# Patient Record
Sex: FEMALE | Race: WHITE | NOT HISPANIC OR LATINO | Employment: FULL TIME | URBAN - METROPOLITAN AREA
[De-identification: names, ages, dates, MRNs, and addresses within clinical notes are randomized per-mention and may not be internally consistent; named-entity substitution may affect disease eponyms.]

---

## 2017-01-03 ENCOUNTER — OFFICE VISIT (OUTPATIENT)
Dept: FAMILY MEDICINE CLINIC | Facility: CLINIC | Age: 52
End: 2017-01-03

## 2017-01-03 VITALS
WEIGHT: 293 LBS | HEART RATE: 82 BPM | OXYGEN SATURATION: 95 % | HEIGHT: 68 IN | SYSTOLIC BLOOD PRESSURE: 108 MMHG | BODY MASS INDEX: 44.41 KG/M2 | DIASTOLIC BLOOD PRESSURE: 84 MMHG

## 2017-01-03 DIAGNOSIS — I10 ESSENTIAL HYPERTENSION: ICD-10-CM

## 2017-01-03 DIAGNOSIS — E66.01 MORBID OBESITY DUE TO EXCESS CALORIES (HCC): ICD-10-CM

## 2017-01-03 DIAGNOSIS — E11.9 TYPE 2 DIABETES MELLITUS WITHOUT COMPLICATION, WITHOUT LONG-TERM CURRENT USE OF INSULIN (HCC): Primary | ICD-10-CM

## 2017-01-03 LAB — HBA1C MFR BLD: 6.9 %

## 2017-01-03 PROCEDURE — 99213 OFFICE O/P EST LOW 20 MIN: CPT | Performed by: FAMILY MEDICINE

## 2017-01-03 PROCEDURE — 83036 HEMOGLOBIN GLYCOSYLATED A1C: CPT | Performed by: FAMILY MEDICINE

## 2017-01-03 NOTE — MR AVS SNAPSHOT
Isa Vogel   1/3/2017 3:00 PM   Office Visit    Provider:  Dayna Llamas MD   Department:  [facility]   Dept Phone:  [phone]                Your Full Care Plan              Your Updated Medication List          This list is accurate as of: 1/3/17  3:49 PM.  Always use your most recent med list.                * albuterol (2.5 MG/3ML) 0.083% nebulizer solution   Commonly known as:  PROVENTIL       * albuterol 108 (90 BASE) MCG/ACT inhaler   Commonly known as:  PROVENTIL HFA;VENTOLIN HFA   Inhale 2 puffs every 4 (four) hours as needed for wheezing.       azithromycin 250 MG tablet   Commonly known as:  ZITHROMAX Z-FOX   Take 2 tablets the first day, then 1 tablet daily for 4 days.       Empagliflozin 25 MG tablet   Commonly known as:  JARDIANCE   Take 1 tablet/day by mouth Daily.       * HYDROcodone-acetaminophen 7.5-325 MG per tablet   Commonly known as:  NORCO       * HYDROcodone-acetaminophen 5-325 MG per tablet   Commonly known as:  NORCO   Take 1 tablet by mouth Every 6 (Six) Hours As Needed for moderate pain (4-6).       losartan-hydrochlorothiazide 100-25 MG per tablet   Commonly known as:  HYZAAR   Take 1 tablet by mouth daily.       metFORMIN 1000 MG (OSM) 24 hr tablet   Commonly known as:  FORTAMET   Take 1 tablet by mouth daily with breakfast.       predniSONE 20 MG tablet   Commonly known as:  DELTASONE   Take 2 tablets by mouth Daily.       promethazine-dextromethorphan 6.25-15 MG/5ML syrup   Commonly known as:  PROMETHAZINE-DM   Take 5 mL by mouth 4 (four) times a day as needed for cough.       simvastatin 20 MG tablet   Commonly known as:  ZOCOR   Take 1 tablet by mouth every night.       ZANTAC 300 MG tablet   Generic drug:  raNITIdine       * Notice:  This list has 4 medication(s) that are the same as other medications prescribed for you. Read the directions carefully, and ask your doctor or other care provider to review them with  "you.            We Performed the Following     POC Glycosylated Hemoglobin (Hb A1C)       You Were Diagnosed With        Codes Comments    Type 2 diabetes mellitus without complication, without long-term current use of insulin    -  Primary ICD-10-CM: E11.9  ICD-9-CM: 250.00     Essential hypertension     ICD-10-CM: I10  ICD-9-CM: 401.9     Morbid obesity due to excess calories     ICD-10-CM: E66.01  ICD-9-CM: 278.01       Medications to be Given to You by a Medical Professional     Due       Frequency    (none) meclizine (ANTIVERT) tablet 25 mg  3 Times Daily PRN      Instructions     None    Patient Instructions History      [employer] Signup     Our records indicate that you have an active [employer] account.    You can view your After Visit Summary by going to [employer] and logging in with your [employer] username and password.  If you don't have a [employer] username and password but a parent or guardian has access to your record, the parent or guardian should login with their own [employer] username and password and access your record to view the After Visit Summary.    If you have questions, you can email [email or web address] or call [phone] to talk to our [employer] staff.  Remember, [employer] is NOT to be used for urgent needs.  For medical emergencies, dial 911.               Other Info from Your Visit           Your Appointments     Apr 04, 2017  9:45 AM EDT   Follow Up with Dayna Llamas MD   [facility] (--)    [address] [place] [address] [address]  [facility] [id number] [phone]           Arrive 15 minutes prior to appointment.              Allergies     Lisinopril      Tramadol        Vital Signs     Blood Pressure Pulse Height Weight Oxygen Saturation Body Mass Index    108/84 82 68\" (172.7 cm) 293 lb (133 kg) 95% 44.55 kg/m2    Smoking Status                   Never Smoker           Problems and Diagnoses Noted     Diabetes    High blood " pressure    Severe obesity      Results     POC Glycosylated Hemoglobin (Hb A1C)      Component Value Standard Range & Units    Hemoglobin A1C 6.9 %

## 2017-01-03 NOTE — PROGRESS NOTES
Subjective   Isa Vogel is a 51 y.o. female. Pt is here for 3 month a1c check.     Diabetes   She presents for her follow-up diabetic visit. She has type 2 diabetes mellitus. No MedicAlert identification noted. The initial diagnosis of diabetes was made 6 months ago. Her disease course has been improving. There are no hypoglycemic associated symptoms. There are no diabetic associated symptoms. There are no hypoglycemic complications. Symptoms are stable. There are no diabetic complications. Risk factors for coronary artery disease include diabetes mellitus, dyslipidemia, obesity and hypertension. Current diabetic treatment includes diet, oral agent (monotherapy) and oral agent (dual therapy). She is compliant with treatment all of the time. Her weight is stable. She is following a diabetic diet. Meal planning includes avoidance of concentrated sweets. She has not had a previous visit with a dietitian (insurance would not cover.). Exercise: treadmill 2 times a week.  2 miles.   There is no change in her home blood glucose trend. (Haven't been checking recently, will occasionally check at work.  ) An ACE inhibitor/angiotensin II receptor blocker is being taken. She does not see a podiatrist.Eye exam is current (saw optho 11/16. new glasses).        The following portions of the patient's history were reviewed and updated as appropriate: allergies, current medications, past family history, past medical history, past social history, past surgical history and problem list.    Review of Systems   Constitutional: Negative.    HENT: Negative.    Eyes: Negative.    Respiratory: Negative.    Cardiovascular: Negative.    Gastrointestinal: Negative.    Genitourinary: Negative.    Musculoskeletal: Negative.         Doing pt twice week for left shoulder rotator cuff surgery dec 1.     Hematological: Negative.    Psychiatric/Behavioral: Negative.        Objective     Vitals:    01/03/17 1503   BP: 108/84   Pulse: 82   SpO2: 95%  "  Weight: 293 lb (133 kg)   Height: 68\" (172.7 cm)       Physical Exam   Constitutional: She is oriented to person, place, and time. She appears well-developed and well-nourished.   HENT:   Head: Normocephalic and atraumatic.   Eyes: EOM are normal. Pupils are equal, round, and reactive to light. Right eye exhibits no discharge. Left eye exhibits no discharge.   Neck: Normal range of motion. Neck supple.   Cardiovascular: Normal rate, regular rhythm, normal heart sounds and intact distal pulses.    Pulmonary/Chest: Effort normal and breath sounds normal.   Abdominal: Soft. Bowel sounds are normal. She exhibits no mass. There is no tenderness.   Musculoskeletal: Normal range of motion.        Right shoulder: She exhibits no swelling.   Neurological: She is alert and oriented to person, place, and time. She has normal reflexes.   Skin: Skin is warm and dry. No cyanosis. Nails show no clubbing.   Psychiatric: She has a normal mood and affect. Her behavior is normal. Judgment and thought content normal.       Assessment/Plan     Problem List Items Addressed This Visit        Cardiovascular and Mediastinum    Essential hypertension       Digestive    Morbid obesity due to excess calories       Endocrine    Diabetes mellitus - Primary    Relevant Orders    POC Glycosylated Hemoglobin (Hb A1C) (Completed)        Pneumovax 23 vaccine 3-4 years ago   Doing well.    Continue current medications.    a1c today 6.9.  "

## 2017-03-30 ENCOUNTER — OFFICE VISIT (OUTPATIENT)
Dept: FAMILY MEDICINE CLINIC | Facility: CLINIC | Age: 52
End: 2017-03-30

## 2017-03-30 VITALS
OXYGEN SATURATION: 99 % | DIASTOLIC BLOOD PRESSURE: 84 MMHG | SYSTOLIC BLOOD PRESSURE: 120 MMHG | BODY MASS INDEX: 43.35 KG/M2 | WEIGHT: 286 LBS | HEIGHT: 68 IN | TEMPERATURE: 97.9 F | HEART RATE: 81 BPM

## 2017-03-30 DIAGNOSIS — R10.12 LEFT UPPER QUADRANT PAIN: ICD-10-CM

## 2017-03-30 DIAGNOSIS — R19.7 DIARRHEA, UNSPECIFIED TYPE: Primary | ICD-10-CM

## 2017-03-30 LAB
ALBUMIN SERPL-MCNC: 4.5 G/DL (ref 3.2–4.8)
ALBUMIN/GLOB SERPL: 1.7 G/DL (ref 1.5–2.5)
ALP SERPL-CCNC: 57 U/L (ref 25–100)
ALT SERPL W P-5'-P-CCNC: 32 U/L (ref 7–40)
AMYLASE SERPL-CCNC: 56 U/L (ref 30–118)
ANION GAP SERPL CALCULATED.3IONS-SCNC: 8 MMOL/L (ref 3–11)
AST SERPL-CCNC: 31 U/L (ref 0–33)
BASOPHILS # BLD AUTO: 0.06 10*3/MM3 (ref 0–0.2)
BASOPHILS NFR BLD AUTO: 0.5 % (ref 0–1)
BILIRUB SERPL-MCNC: 0.6 MG/DL (ref 0.3–1.2)
BUN BLD-MCNC: 16 MG/DL (ref 9–23)
BUN/CREAT SERPL: 16 (ref 7–25)
C DIFF TOX GENS STL QL NAA+PROBE: NOT DETECTED
CALCIUM SPEC-SCNC: 10.7 MG/DL (ref 8.7–10.4)
CHLORIDE SERPL-SCNC: 100 MMOL/L (ref 99–109)
CO2 SERPL-SCNC: 32 MMOL/L (ref 20–31)
CREAT BLD-MCNC: 1 MG/DL (ref 0.6–1.3)
DEPRECATED RDW RBC AUTO: 44.6 FL (ref 37–54)
EOSINOPHIL # BLD AUTO: 0.14 10*3/MM3 (ref 0.1–0.3)
EOSINOPHIL NFR BLD AUTO: 1.3 % (ref 0–3)
ERYTHROCYTE [DISTWIDTH] IN BLOOD BY AUTOMATED COUNT: 12.8 % (ref 11.3–14.5)
GFR SERPL CREATININE-BSD FRML MDRD: 59 ML/MIN/1.73
GLOBULIN UR ELPH-MCNC: 2.6 GM/DL
GLUCOSE BLD-MCNC: 162 MG/DL (ref 70–100)
HAV IGM SERPL QL IA: NORMAL
HBV CORE IGM SERPL QL IA: NORMAL
HBV SURFACE AG SERPL QL IA: NORMAL
HCT VFR BLD AUTO: 47.1 % (ref 34.5–44)
HCV AB SER DONR QL: NORMAL
HGB BLD-MCNC: 15.5 G/DL (ref 11.5–15.5)
IMM GRANULOCYTES # BLD: 0.05 10*3/MM3 (ref 0–0.03)
IMM GRANULOCYTES NFR BLD: 0.4 % (ref 0–0.6)
LIPASE SERPL-CCNC: 51 U/L (ref 6–51)
LYMPHOCYTES # BLD AUTO: 3.2 10*3/MM3 (ref 0.6–4.8)
LYMPHOCYTES NFR BLD AUTO: 28.6 % (ref 24–44)
MCH RBC QN AUTO: 31.5 PG (ref 27–31)
MCHC RBC AUTO-ENTMCNC: 32.9 G/DL (ref 32–36)
MCV RBC AUTO: 95.7 FL (ref 80–99)
MONOCYTES # BLD AUTO: 0.66 10*3/MM3 (ref 0–1)
MONOCYTES NFR BLD AUTO: 5.9 % (ref 0–12)
NEUTROPHILS # BLD AUTO: 7.07 10*3/MM3 (ref 1.5–8.3)
NEUTROPHILS NFR BLD AUTO: 63.3 % (ref 41–71)
PLATELET # BLD AUTO: 344 10*3/MM3 (ref 150–450)
PMV BLD AUTO: 10.5 FL (ref 6–12)
POTASSIUM BLD-SCNC: 4.1 MMOL/L (ref 3.5–5.5)
PROT SERPL-MCNC: 7.1 G/DL (ref 5.7–8.2)
RBC # BLD AUTO: 4.92 10*6/MM3 (ref 3.89–5.14)
SODIUM BLD-SCNC: 140 MMOL/L (ref 132–146)
TSH SERPL DL<=0.05 MIU/L-ACNC: 1.15 MIU/ML (ref 0.35–5.35)
WBC NRBC COR # BLD: 11.18 10*3/MM3 (ref 3.5–10.8)

## 2017-03-30 PROCEDURE — 36415 COLL VENOUS BLD VENIPUNCTURE: CPT | Performed by: NURSE PRACTITIONER

## 2017-03-30 PROCEDURE — 85025 COMPLETE CBC W/AUTO DIFF WBC: CPT | Performed by: NURSE PRACTITIONER

## 2017-03-30 PROCEDURE — 80053 COMPREHEN METABOLIC PANEL: CPT | Performed by: NURSE PRACTITIONER

## 2017-03-30 PROCEDURE — 87046 STOOL CULTR AEROBIC BACT EA: CPT | Performed by: NURSE PRACTITIONER

## 2017-03-30 PROCEDURE — 99214 OFFICE O/P EST MOD 30 MIN: CPT | Performed by: NURSE PRACTITIONER

## 2017-03-30 PROCEDURE — 84443 ASSAY THYROID STIM HORMONE: CPT | Performed by: NURSE PRACTITIONER

## 2017-03-30 PROCEDURE — 87493 C DIFF AMPLIFIED PROBE: CPT | Performed by: NURSE PRACTITIONER

## 2017-03-30 PROCEDURE — 80074 ACUTE HEPATITIS PANEL: CPT | Performed by: NURSE PRACTITIONER

## 2017-03-30 PROCEDURE — 87209 SMEAR COMPLEX STAIN: CPT | Performed by: NURSE PRACTITIONER

## 2017-03-30 PROCEDURE — 87045 FECES CULTURE AEROBIC BACT: CPT | Performed by: NURSE PRACTITIONER

## 2017-03-30 PROCEDURE — 83690 ASSAY OF LIPASE: CPT | Performed by: NURSE PRACTITIONER

## 2017-03-30 PROCEDURE — 87177 OVA AND PARASITES SMEARS: CPT | Performed by: NURSE PRACTITIONER

## 2017-03-30 PROCEDURE — 82150 ASSAY OF AMYLASE: CPT | Performed by: NURSE PRACTITIONER

## 2017-03-30 NOTE — PROGRESS NOTES
Subjective   Isa Vogel is a 51 y.o. female.     History of Present Illness Complaining of several weeks of frequent yellow stools, with pain in left upper quad that radiates to left flank. States pain is sharp, like a catch and only lasts a few seconds. Will occur several times a day. No nausea but diarrhea is bigger symptom. Will have 8 stools a day. Last formed stool was 2 weeks ago. She works at [employer] and they have had c. Diff. She does have increased pain after eating. This is associated with indigestion. No fever. She has treated with Zantac, and zofran.  No emesis. Her colonoscopy was 4 months ago. Showed a polyp and diverticulosis. Neg family history of colon disease.    The following portions of the patient's history were reviewed and updated as appropriate: allergies, current medications, past family history, past medical history, past social history, past surgical history and problem list.    Review of Systems   Constitutional: Negative for fatigue, fever and unexpected weight change.   HENT: Negative for congestion, hearing loss, nosebleeds, rhinorrhea, sore throat, trouble swallowing and voice change.    Eyes: Negative for pain, discharge, redness and visual disturbance.   Respiratory: Negative for cough, chest tightness, shortness of breath and wheezing.    Cardiovascular: Negative for chest pain, palpitations and leg swelling.   Gastrointestinal: Positive for abdominal pain, diarrhea and nausea. Negative for abdominal distention, anal bleeding, blood in stool, constipation and vomiting.   Endocrine: Negative for cold intolerance, heat intolerance, polydipsia, polyphagia and polyuria.   Genitourinary: Negative for dysuria, flank pain, frequency and hematuria.   Musculoskeletal: Negative for arthralgias, gait problem, joint swelling and myalgias.   Skin: Negative for color change and rash.   Neurological: Negative for dizziness, tremors, seizures, syncope, speech difficulty, weakness, numbness and  headaches.   Hematological: Negative.    Psychiatric/Behavioral: Negative.        Objective   Physical Exam   Constitutional: She is oriented to person, place, and time. She appears well-developed and well-nourished. No distress.   HENT:   Head: Normocephalic and atraumatic.   Right Ear: Tympanic membrane and external ear normal.   Left Ear: Tympanic membrane and external ear normal.   Nose: Nose normal.   Mouth/Throat: Oropharynx is clear and moist. No oropharyngeal exudate.   Eyes: Conjunctivae are normal. Pupils are equal, round, and reactive to light. Right eye exhibits no discharge. Left eye exhibits no discharge. No scleral icterus.   Neck: Neck supple. No tracheal deviation present. No thyromegaly present.   Cardiovascular: Normal rate, regular rhythm and normal heart sounds.  Exam reveals no gallop and no friction rub.    No murmur heard.  Pulmonary/Chest: Effort normal and breath sounds normal. No respiratory distress. She has no wheezes.   Abdominal: Soft. Bowel sounds are normal. She exhibits no distension and no mass. There is tenderness.   Diffuse abd pain with palpation. Most tender in LUQ.   Musculoskeletal: She exhibits no edema or deformity.   Lymphadenopathy:     She has no cervical adenopathy.   Neurological: She is alert and oriented to person, place, and time. Coordination normal.   Skin: Skin is warm and dry. No rash noted. No erythema.   Psychiatric: She has a normal mood and affect. Her speech is normal and behavior is normal. Judgment and thought content normal.   Nursing note and vitals reviewed.      Isa was seen today for abdominal pain.    Diagnoses and all orders for this visit:    Diarrhea, unspecified type  -     CBC & Differential  -     Comprehensive Metabolic Panel  -     Hepatitis Panel, Acute  -     TSH  -     Amylase  -     Lipase  -     CBC Auto Differential  -     Stool Culture  -     Clostridium Difficile EIA; Future  -     Ova & Parasite Examination; Future    Left upper  quadrant pain  -     CBC & Differential  -     Comprehensive Metabolic Panel  -     Hepatitis Panel, Acute  -     TSH  -     Amylase  -     Lipase  -     CBC Auto Differential    Discussed differential. Will obtain labs and stool studies. We may need abd US or CT if all negative and symptoms persist. Follow up symptoms persist or worsen.  I personally spent over half of a total 25 minutes face to face with the patient in counseling and discussion and/or coordination of care as described above.

## 2017-04-01 LAB — BACTERIA SPEC AEROBE CULT: NORMAL

## 2017-04-03 ENCOUNTER — TELEPHONE (OUTPATIENT)
Dept: FAMILY MEDICINE CLINIC | Facility: CLINIC | Age: 52
End: 2017-04-03

## 2017-04-03 NOTE — TELEPHONE ENCOUNTER
"----- Message from Oriana Nevarez sent at 4/3/2017  8:36 AM EDT -----  Regarding: RTN CALL  Contact: [phone]  PT CALLED INFORMING US THAT SHE \"JUST MISSED A CALL FROM YOU\"  PLEASE CALL HER BACK WHEN AVAILABLE.      Discussed results of all labs. O&P is still pending. She may need to followup with GI and have another scope.  "

## 2017-04-04 ENCOUNTER — OFFICE VISIT (OUTPATIENT)
Dept: FAMILY MEDICINE CLINIC | Facility: CLINIC | Age: 52
End: 2017-04-04

## 2017-04-04 VITALS
HEIGHT: 68 IN | DIASTOLIC BLOOD PRESSURE: 86 MMHG | BODY MASS INDEX: 44.25 KG/M2 | HEART RATE: 67 BPM | SYSTOLIC BLOOD PRESSURE: 132 MMHG | WEIGHT: 292 LBS | TEMPERATURE: 97.7 F | OXYGEN SATURATION: 98 %

## 2017-04-04 DIAGNOSIS — R10.84 GENERALIZED ABDOMINAL PAIN: ICD-10-CM

## 2017-04-04 DIAGNOSIS — R10.11 RIGHT UPPER QUADRANT PAIN: ICD-10-CM

## 2017-04-04 DIAGNOSIS — E11.9 TYPE 2 DIABETES MELLITUS WITHOUT COMPLICATION, WITHOUT LONG-TERM CURRENT USE OF INSULIN (HCC): Primary | ICD-10-CM

## 2017-04-04 LAB — HBA1C MFR BLD: 7.1 %

## 2017-04-04 PROCEDURE — 83036 HEMOGLOBIN GLYCOSYLATED A1C: CPT | Performed by: FAMILY MEDICINE

## 2017-04-04 PROCEDURE — 99214 OFFICE O/P EST MOD 30 MIN: CPT | Performed by: FAMILY MEDICINE

## 2017-04-04 RX ORDER — PANTOPRAZOLE SODIUM 40 MG/1
40 TABLET, DELAYED RELEASE ORAL DAILY
Qty: 90 TABLET | Refills: 1 | Status: SHIPPED | OUTPATIENT
Start: 2017-04-04 | End: 2017-11-22 | Stop reason: SDUPTHER

## 2017-04-04 RX ORDER — CIPROFLOXACIN 500 MG/1
500 TABLET, FILM COATED ORAL 2 TIMES DAILY
Qty: 28 TABLET | Refills: 0 | Status: SHIPPED | OUTPATIENT
Start: 2017-04-04 | End: 2017-08-28

## 2017-04-04 RX ORDER — METRONIDAZOLE 500 MG/1
500 TABLET ORAL 3 TIMES DAILY
Qty: 28 TABLET | Refills: 0 | Status: SHIPPED | OUTPATIENT
Start: 2017-04-04 | End: 2017-08-28

## 2017-04-04 NOTE — PATIENT INSTRUCTIONS
Diarrhea, Adult  Diarrhea is frequent loose and watery bowel movements. Diarrhea can make you feel weak and cause you to become dehydrated. Dehydration can make you tired and thirsty, cause you to have a dry mouth, and decrease how often you urinate. Diarrhea typically lasts 2-3 days. However, it can last longer if it is a sign of something more serious. It is important to treat your diarrhea as told by your health care provider.  HOME CARE INSTRUCTIONS  Eating and Drinking  Follow these recommendations as told by your health care provider:  · Take an oral rehydration solution (ORS). This is a drink that is sold at pharmacies and retail stores.  · Drink clear fluids, such as water, ice chips, diluted fruit juice, and low-calorie sports drinks.  · Eat bland, easy-to-digest foods in small amounts as you are able. These foods include bananas, applesauce, rice, lean meats, toast, and crackers.  · Avoid drinking fluids that contain a lot of sugar or caffeine, such as energy drinks, sports drinks, and soda.  · Avoid alcohol.  · Avoid spicy or fatty foods.  General Instructions  · Drink enough fluid to keep your urine clear or pale yellow.  · Wash your hands often. If soap and water are not available, use hand .  · Make sure that all people in your household wash their hands well and often.  · Take over-the-counter and prescription medicines only as told by your health care provider.  · Rest at home while you recover.  · Watch your condition for any changes.  · Take a warm bath to relieve any burning or pain from frequent diarrhea episodes.  · Keep all follow-up visits as told by your health care provider. This is important.  SEEK MEDICAL CARE IF:  · You have a fever.  · Your diarrhea gets worse.  · You have new symptoms.  · You cannot keep fluids down.  · You feel light-headed or dizzy.  · You have a headache  · You have muscle cramps.  SEEK IMMEDIATE MEDICAL CARE IF:  · You have chest pain.  · You feel extremely  weak or you faint.  · You have bloody or black stools or stools that look like tar.  · You have severe pain, cramping, or bloating in your abdomen.  · You have trouble breathing or you are breathing very quickly.  · Your heart is beating very quickly.  · Your skin feels cold and clammy.  · You feel confused.  · You have signs of dehydration, such as:    Dark urine, very little urine, or no urine.    Cracked lips.    Dry mouth.    Sunken eyes.    Sleepiness.    Weakness.     This information is not intended to replace advice given to you by your health care provider. Make sure you discuss any questions you have with your health care provider.     Document Released: 12/08/2003 Document Revised: 01/13/2017 Document Reviewed: 08/23/2016  [employer] Interactive Patient Education ©2016 [employer]  Cholelithiasis  Cholelithiasis (also called gallstones) is a form of gallbladder disease in which gallstones form in your gallbladder. The gallbladder is an organ that stores bile made in the liver, which helps digest fats. Gallstones begin as small crystals and slowly grow into stones. Gallstone pain occurs when the gallbladder spasms and a gallstone is blocking the duct. Pain can also occur when a stone passes out of the duct.   RISK FACTORS  · Being female.    · Having multiple pregnancies. Health care providers sometimes advise removing diseased gallbladders before future pregnancies.    · Being obese.  · Eating a diet heavy in fried foods and fat.    · Being older than 60 years and increasing age.    · Prolonged use of medicines containing female hormones.    · Having diabetes mellitus.    · Rapidly losing weight.    · Having a family history of gallstones (heredity).    SYMPTOMS  · Nausea.    · Vomiting.  · Abdominal pain.    · Yellowing of the skin (jaundice).    · Sudden pain. It may persist from several minutes to several hours.  · Fever.    · Tenderness to the touch.   In some cases, when gallstones do not move into  "the bile duct, people have no pain or symptoms. These are called \"silent\" gallstones.   TREATMENT  Silent gallstones do not need treatment. In severe cases, emergency surgery may be required. Options for treatment include:  · Surgery to remove the gallbladder. This is the most common treatment.  · Medicines. These do not always work and may take 6-12 months or more to work.  · Shock wave treatment (extracorporeal biliary lithotripsy). In this treatment an ultrasound machine sends shock waves to the gallbladder to break gallstones into smaller pieces that can pass into the intestines or be dissolved by medicine.  HOME CARE INSTRUCTIONS   · Only take over-the-counter or prescription medicines for pain, discomfort, or fever as directed by your health care provider.    · Follow a low-fat diet until seen again by your health care provider. Fat causes the gallbladder to contract, which can result in pain.    · Follow up with your health care provider as directed. Attacks are almost always recurrent and surgery is usually required for permanent treatment.    SEEK IMMEDIATE MEDICAL CARE IF:   · Your pain increases and is not controlled by medicines.    · You have a fever or persistent symptoms for more than 2-3 days.    · You have a fever and your symptoms suddenly get worse.    · You have persistent nausea and vomiting.    MAKE SURE YOU:   · Understand these instructions.  · Will watch your condition.  · Will get help right away if you are not doing well or get worse.     This information is not intended to replace advice given to you by your health care provider. Make sure you discuss any questions you have with your health care provider.     Document Released: 12/14/2006 Document Revised: 08/20/2014 Document Reviewed: 06/11/2014  [employer] Interactive Patient Education ©2016 [employer]    "

## 2017-04-04 NOTE — PROGRESS NOTES
Subjective   Isa Vogel is a 51 y.o. female. [facility] A1C 7.1 today.     Diabetes   She presents for her follow-up diabetic visit. She has type 2 diabetes mellitus. No MedicAlert identification noted. The initial diagnosis of diabetes was made 6 months ago. Her disease course has been fluctuating. There are no hypoglycemic associated symptoms. Pertinent negatives for hypoglycemia include no confusion, dizziness, headaches or nervousness/anxiousness. Pertinent negatives for diabetes include no blurred vision, no chest pain, no fatigue, no foot paresthesias, no foot ulcerations, no polydipsia, no polyphagia, no polyuria, no visual change, no weakness and no weight loss. There are no hypoglycemic complications. Symptoms are stable. There are no diabetic complications. Risk factors for coronary artery disease include diabetes mellitus, dyslipidemia, family history, hypertension, obesity, post-menopausal, sedentary lifestyle and stress. Current diabetic treatment includes oral agent (dual therapy). She is compliant with treatment all of the time. Her weight is stable. She is following a high fat/cholesterol diet. When asked about meal planning, she reported none. She has not had a previous visit with a dietitian. She participates in exercise three times a week. There is no change in her home blood glucose trend. An ACE inhibitor/angiotensin II receptor blocker is being taken. She does not see a podiatrist.Eye exam is current.   Abdominal Pain   This is a recurrent problem. The current episode started 1 to 4 weeks ago. The onset quality is undetermined. The problem occurs 2 to 4 times per day. The problem has been unchanged. The pain is located in the RUQ. The pain is at a severity of 9/10. The pain is severe. The quality of the pain is sharp. The abdominal pain does not radiate. Associated symptoms include diarrhea, flatus and frequency. Pertinent negatives include no anorexia, arthralgias,  belching, constipation, dysuria, fever, headaches, hematochezia, hematuria, melena, myalgias, nausea, vomiting or weight loss. The pain is aggravated by eating. The pain is relieved by sitting up. She has tried antacids for the symptoms. The treatment provided no relief. Prior diagnostic workup includes lower endoscopy. Her past medical history is significant for GERD. There is no history of abdominal surgery, colon cancer, Crohn's disease, gallstones, irritable bowel syndrome, pancreatitis, PUD or ulcerative colitis.      The following portions of the patient's history were reviewed and updated as appropriate: allergies, current medications, past family history, past medical history, past social history, past surgical history and problem list.    Review of Systems   Constitutional: Negative for chills, fatigue, fever, unexpected weight change and weight loss.   HENT: Negative for congestion, ear pain, nosebleeds, rhinorrhea, sinus pressure, sneezing, sore throat and trouble swallowing.    Eyes: Negative for blurred vision, itching and visual disturbance.   Respiratory: Negative for cough, chest tightness, shortness of breath and wheezing.    Cardiovascular: Negative for chest pain, palpitations and leg swelling.   Gastrointestinal: Positive for abdominal pain, diarrhea and flatus. Negative for abdominal distention, anal bleeding, anorexia, blood in stool, constipation, hematochezia, melena, nausea and vomiting.   Endocrine: Negative for cold intolerance, heat intolerance, polydipsia, polyphagia and polyuria.   Genitourinary: Positive for frequency. Negative for difficulty urinating, dysuria, hematuria and urgency.   Musculoskeletal: Negative for arthralgias, back pain, gait problem and myalgias.   Skin: Negative for rash and wound.   Neurological: Negative for dizziness, weakness, numbness and headaches.   Hematological: Negative for adenopathy. Does not bruise/bleed easily.   Psychiatric/Behavioral: Negative for  "agitation, confusion, decreased concentration and suicidal ideas. The patient is not nervous/anxious.        Objective     Vitals:    04/04/17 0938   BP: 132/86   Pulse: 67   Temp: 97.7 °F (36.5 °C)   SpO2: 98%   Weight: 292 lb (132 kg)   Height: 68\" (172.7 cm)       Physical Exam   Constitutional: She is oriented to person, place, and time. She appears well-developed and well-nourished.   HENT:   Head: Normocephalic and atraumatic.   Right Ear: External ear normal.   Left Ear: External ear normal.   Nose: Nose normal.   Mouth/Throat: Oropharynx is clear and moist.   Eyes: EOM are normal. Pupils are equal, round, and reactive to light. Right eye exhibits no discharge. Left eye exhibits no discharge.   Neck: Normal range of motion. Neck supple. No thyromegaly present.   Cardiovascular: Normal rate, regular rhythm, normal heart sounds and intact distal pulses.  Exam reveals no gallop and no friction rub.    No murmur heard.  Pulmonary/Chest: Effort normal and breath sounds normal. No respiratory distress. She has no wheezes. She has no rales. She exhibits no tenderness.   Abdominal: Soft. Bowel sounds are normal. She exhibits distension. She exhibits no mass. There is tenderness. There is guarding.   RUQ quadrant tenderness. Positive Almendarez's sign.    Musculoskeletal: Normal range of motion. She exhibits no tenderness.        Right shoulder: She exhibits no swelling.   Lymphadenopathy:     She has no cervical adenopathy.   Neurological: She is alert and oriented to person, place, and time. She has normal reflexes. She displays normal reflexes.   Skin: Skin is warm and dry. No cyanosis. Nails show no clubbing.   Psychiatric: She has a normal mood and affect. Her behavior is normal. Judgment and thought content normal.   Vitals reviewed.      Assessment/Plan     Problem List Items Addressed This Visit        Endocrine    Type 2 diabetes mellitus without complication - Primary    Relevant Orders    POC Glycosylated " Hemoglobin (Hb A1C) (Completed)       Nervous and Auditory    Generalized abdominal pain    Relevant Medications    ciprofloxacin (CIPRO) 500 MG tablet    metroNIDAZOLE (FLAGYL) 500 MG tablet    pantoprazole (PROTONIX) 40 MG EC tablet      Other Visit Diagnoses     Right upper quadrant pain        Relevant Orders    US Gallbladder        Check ruq us  If neg start cipro/flagyl   If sx not improved check hida  Start protonix  Fu 8 weeks.   a1c 7.1 up from prior.

## 2017-04-06 ENCOUNTER — HOSPITAL ENCOUNTER (OUTPATIENT)
Dept: ULTRASOUND IMAGING | Facility: HOSPITAL | Age: 52
Discharge: HOME | End: 2017-04-06
Attending: FAMILY MEDICINE | Admitting: FAMILY MEDICINE

## 2017-04-06 DIAGNOSIS — R10.11 RIGHT UPPER QUADRANT PAIN: ICD-10-CM

## 2017-04-06 LAB
O+P SPEC MICRO: NORMAL
O+P STL TRI STN: NORMAL

## 2017-04-06 PROCEDURE — 76705 ECHO EXAM OF ABDOMEN: CPT

## 2017-04-10 DIAGNOSIS — R10.84 GENERALIZED ABDOMINAL PAIN: Primary | ICD-10-CM

## 2017-04-12 ENCOUNTER — TELEPHONE (OUTPATIENT)
Dept: FAMILY MEDICINE CLINIC | Facility: CLINIC | Age: 52
End: 2017-04-12

## 2017-04-12 RX ORDER — ONDANSETRON 4 MG/1
4 TABLET, FILM COATED ORAL EVERY 8 HOURS PRN
Qty: 8 TABLET | Refills: 1 | Status: SHIPPED | OUTPATIENT
Start: 2017-04-12 | End: 2017-12-07 | Stop reason: WASHOUT

## 2017-04-12 NOTE — TELEPHONE ENCOUNTER
----- Message from Dayna Llamas MD sent at 4/10/2017  3:35 PM EDT -----  Notify pt gb neg stones.  Refer hida scan    PT NOTIFIED OF RESULTS AND REFERRAL FOR HIDA SCAN.

## 2017-05-01 ENCOUNTER — HOSPITAL ENCOUNTER (OUTPATIENT)
Dept: NUCLEAR MEDICINE | Facility: HOSPITAL | Age: 52
Discharge: HOME | End: 2017-05-01
Attending: FAMILY MEDICINE

## 2017-05-01 DIAGNOSIS — R10.84 GENERALIZED ABDOMINAL PAIN: ICD-10-CM

## 2017-05-01 PROCEDURE — 0 TECHNETIUM TC 99M MEBROFENIN KIT: Performed by: FAMILY MEDICINE

## 2017-05-01 PROCEDURE — A9537 TC99M MEBROFENIN: HCPCS | Performed by: FAMILY MEDICINE

## 2017-05-01 PROCEDURE — 25010000002 SINCALIDE PER 5 MCG: Performed by: FAMILY MEDICINE

## 2017-05-01 PROCEDURE — 78227 HEPATOBIL SYST IMAGE W/DRUG: CPT

## 2017-05-01 RX ORDER — KIT FOR THE PREPARATION OF TECHNETIUM TC 99M MEBROFENIN 45 MG/10ML
1 INJECTION, POWDER, LYOPHILIZED, FOR SOLUTION INTRAVENOUS
Status: COMPLETED | OUTPATIENT
Start: 2017-05-01 | End: 2017-05-01

## 2017-05-01 RX ADMIN — SINCALIDE 2.6 MCG: 5 INJECTION, POWDER, LYOPHILIZED, FOR SOLUTION INTRAVENOUS at 13:15

## 2017-05-01 RX ADMIN — MEBROFENIN 1 DOSE: 45 INJECTION, POWDER, LYOPHILIZED, FOR SOLUTION INTRAVENOUS at 12:25

## 2017-05-02 DIAGNOSIS — R94.8 ABNORMAL BILIARY HIDA SCAN: Primary | ICD-10-CM

## 2017-05-10 ENCOUNTER — APPOINTMENT (OUTPATIENT)
Dept: PREADMISSION TESTING | Facility: HOSPITAL | Age: 52
End: 2017-05-10

## 2017-05-10 LAB
ALBUMIN SERPL-MCNC: 4.3 G/DL (ref 3.2–4.8)
ALBUMIN/GLOB SERPL: 1.7 G/DL (ref 1.5–2.5)
ALP SERPL-CCNC: 64 U/L (ref 25–100)
ALT SERPL W P-5'-P-CCNC: 34 U/L (ref 7–40)
ANION GAP SERPL CALCULATED.3IONS-SCNC: 1 MMOL/L (ref 3–11)
AST SERPL-CCNC: 28 U/L (ref 0–33)
BILIRUB SERPL-MCNC: 0.5 MG/DL (ref 0.3–1.2)
BUN BLD-MCNC: 13 MG/DL (ref 9–23)
BUN/CREAT SERPL: 13 (ref 7–25)
CALCIUM SPEC-SCNC: 9.9 MG/DL (ref 8.7–10.4)
CHLORIDE SERPL-SCNC: 106 MMOL/L (ref 99–109)
CO2 SERPL-SCNC: 33 MMOL/L (ref 20–31)
CREAT BLD-MCNC: 1 MG/DL (ref 0.6–1.3)
DEPRECATED RDW RBC AUTO: 45 FL (ref 37–54)
ERYTHROCYTE [DISTWIDTH] IN BLOOD BY AUTOMATED COUNT: 12.9 % (ref 11.3–14.5)
GFR SERPL CREATININE-BSD FRML MDRD: 59 ML/MIN/1.73
GLOBULIN UR ELPH-MCNC: 2.6 GM/DL
GLUCOSE BLD-MCNC: 121 MG/DL (ref 70–100)
HCT VFR BLD AUTO: 46.2 % (ref 34.5–44)
HGB BLD-MCNC: 15.2 G/DL (ref 11.5–15.5)
MCH RBC QN AUTO: 31.6 PG (ref 27–31)
MCHC RBC AUTO-ENTMCNC: 32.9 G/DL (ref 32–36)
MCV RBC AUTO: 96 FL (ref 80–99)
PLATELET # BLD AUTO: 318 10*3/MM3 (ref 150–450)
PMV BLD AUTO: 10.3 FL (ref 6–12)
POTASSIUM BLD-SCNC: 4.2 MMOL/L (ref 3.5–5.5)
PROT SERPL-MCNC: 6.9 G/DL (ref 5.7–8.2)
RBC # BLD AUTO: 4.81 10*6/MM3 (ref 3.89–5.14)
SODIUM BLD-SCNC: 140 MMOL/L (ref 132–146)
WBC NRBC COR # BLD: 10.51 10*3/MM3 (ref 3.5–10.8)

## 2017-05-10 PROCEDURE — 36415 COLL VENOUS BLD VENIPUNCTURE: CPT

## 2017-05-10 PROCEDURE — 93005 ELECTROCARDIOGRAM TRACING: CPT

## 2017-05-10 PROCEDURE — 80053 COMPREHEN METABOLIC PANEL: CPT | Performed by: SURGERY

## 2017-05-10 PROCEDURE — 85027 COMPLETE CBC AUTOMATED: CPT | Performed by: SURGERY

## 2017-05-17 ENCOUNTER — LAB REQUISITION (OUTPATIENT)
Dept: LAB | Facility: HOSPITAL | Age: 52
End: 2017-05-17

## 2017-05-17 DIAGNOSIS — K82.8 OTHER SPECIFIED DISEASES OF GALLBLADDER: ICD-10-CM

## 2017-05-17 PROCEDURE — 88304 TISSUE EXAM BY PATHOLOGIST: CPT | Performed by: SURGERY

## 2017-05-18 LAB
CYTO UR: NORMAL
LAB AP CASE REPORT: NORMAL
LAB AP CLINICAL INFORMATION: NORMAL
Lab: NORMAL
PATH REPORT.FINAL DX SPEC: NORMAL
PATH REPORT.GROSS SPEC: NORMAL

## 2017-08-28 ENCOUNTER — OFFICE VISIT (OUTPATIENT)
Dept: FAMILY MEDICINE CLINIC | Facility: CLINIC | Age: 52
End: 2017-08-28

## 2017-08-28 VITALS
HEIGHT: 68 IN | SYSTOLIC BLOOD PRESSURE: 128 MMHG | OXYGEN SATURATION: 95 % | WEIGHT: 293 LBS | BODY MASS INDEX: 44.41 KG/M2 | DIASTOLIC BLOOD PRESSURE: 84 MMHG | HEART RATE: 86 BPM

## 2017-08-28 DIAGNOSIS — H69.82 DYSFUNCTION OF LEFT EUSTACHIAN TUBE: ICD-10-CM

## 2017-08-28 DIAGNOSIS — E11.9 TYPE 2 DIABETES MELLITUS WITHOUT COMPLICATION, WITHOUT LONG-TERM CURRENT USE OF INSULIN (HCC): Primary | ICD-10-CM

## 2017-08-28 DIAGNOSIS — F32.9 REACTIVE DEPRESSION: ICD-10-CM

## 2017-08-28 PROBLEM — H69.92 DYSFUNCTION OF LEFT EUSTACHIAN TUBE: Status: ACTIVE | Noted: 2017-08-28

## 2017-08-28 LAB
HBA1C MFR BLD: 7.3 %
POC CREATININE URINE: 100
POC MICROALBUMIN URINE: 10

## 2017-08-28 PROCEDURE — 83036 HEMOGLOBIN GLYCOSYLATED A1C: CPT | Performed by: FAMILY MEDICINE

## 2017-08-28 PROCEDURE — 82044 UR ALBUMIN SEMIQUANTITATIVE: CPT | Performed by: FAMILY MEDICINE

## 2017-08-28 PROCEDURE — 99214 OFFICE O/P EST MOD 30 MIN: CPT | Performed by: FAMILY MEDICINE

## 2017-08-28 RX ORDER — FLUTICASONE PROPIONATE 50 MCG
2 SPRAY, SUSPENSION (ML) NASAL DAILY
Qty: 1 EACH | Refills: 1 | Status: SHIPPED | OUTPATIENT
Start: 2017-08-28 | End: 2017-09-27

## 2017-08-28 RX ORDER — CITALOPRAM 20 MG/1
20 TABLET ORAL DAILY
Qty: 30 TABLET | Refills: 3 | Status: SHIPPED | OUTPATIENT
Start: 2017-08-28 | End: 2018-02-11 | Stop reason: SDUPTHER

## 2017-08-28 NOTE — PATIENT INSTRUCTIONS
Citalopram oral solution  What is this medicine?  CITALOPRAM (sye SAKINA oh pram) is used to treat depression.  This medicine may be used for other purposes; ask your health care provider or pharmacist if you have questions.  COMMON BRAND NAME(S): Celexa  What should I tell my health care provider before I take this medicine?  They need to know if you have any of these conditions:  -bleeding disorders  -bipolar disorder or a family history of bipolar disorder  -glaucoma  -heart disease  -history of irregular heartbeat  -kidney disease  -liver disease  -low levels of magnesium or potassium in the blood  -receiving electroconvulsive therapy  -seizures  -suicidal thoughts, plans, or attempt; a previous suicide attempt by you or a family member  -take medicines that treat or prevent blood clots  -thyroid disease  -an unusual or allergic reaction to citalopram, escitalopram, other medicines, foods, dyes, or preservatives  -pregnant or trying to become pregnant  -breast-feeding  How should I use this medicine?  Take this medicine by mouth. Follow the directions on the prescription label. Use a specially marked spoon or container to measure your medicine. Ask your pharmacist if you do not have one. Household spoons are not accurate. This medicine can be taken with or without food. Take your medicine at regular intervals. Do not take your medicine more often than directed. Do not stop taking this medicine suddenly except upon the advice of your doctor. Stopping this medicine too quickly may cause serious side effects or your condition may worsen.  A special MedGuide will be given to you by the pharmacist with each prescription and refill. Be sure to read this information carefully each time.  Talk to your pediatrician regarding the use of this medicine in children. Special care may be needed.  Patients over 60 years old may have a stronger reaction and need a smaller dose.  Overdosage: If you think you have taken too much of  this medicine contact a poison control center or emergency room at once.  NOTE: This medicine is only for you. Do not share this medicine with others.  What if I miss a dose?  If you miss a dose, take it as soon as you can. If it is almost time for your next dose, take only that dose. Do not take double or extra doses.  What may interact with this medicine?  Do not take this medicine with any of the following medications:  -certain medicines for fungal infections like fluconazole, itraconazole, ketoconazole, posaconazole, voriconazole  -cisapride  -dofetilide  -dronedarone  -escitalopram  -linezolid  -MAOIs like Carbex, Eldepryl, Marplan, Nardil, and Parnate  -methylene blue (injected into a vein)  -pimozide  -thioridazine  -ziprasidone  This medicine may also interact with the following medications:  -alcohol  -amphetamines  -aspirin and aspirin-like medicines  -carbamazepine  -certain medicines for depression, anxiety, or psychotic disturbances  -certain medicines for infections like chloroquine, clarithromycin, erythromycin, furazolidone, isoniazid, pentamidine  -certain medicines for migraine headaches like almotriptan, eletriptan, frovatriptan, naratriptan, rizatriptan, sumatriptan, zolmitriptan  -certain medicines for sleep  -certain medicines that treat or prevent blood clots like dalteparin, enoxaparin, warfarin  -cimetidine  -diuretics  -fentanyl  -lithium  -methadone  -metoprolol  -NSAIDs, medicines for pain and inflammation, like ibuprofen or naproxen  -omeprazole  -other medicines that prolong the QT interval (cause an abnormal heart rhythm)  -procarbazine  -rasagiline  -supplements like [place]'s wort, kava kava, valerian  -tramadol  -tryptophan  This list may not describe all possible interactions. Give your health care provider a list of all the medicines, herbs, non-prescription drugs, or dietary supplements you use. Also tell them if you smoke, drink alcohol, or use illegal drugs. Some items may  interact with your medicine.  What should I watch for while using this medicine?  Tell your doctor if your symptoms do not get better or if they get worse. Visit your doctor or health care professional for regular checks on your progress. Because it may take several weeks to see the full effects of this medicine, it is important to continue your treatment as prescribed by your doctor.  Patients and their families should watch out for new or worsening thoughts of suicide or depression. Also watch out for sudden changes in feelings such as feeling anxious, agitated, panicky, irritable, hostile, aggressive, impulsive, severely restless, overly excited and hyperactive, or not being able to sleep. If this happens, especially at the beginning of treatment or after a change in dose, call your health care professional.  You may get drowsy or dizzy. Do not drive, use machinery, or do anything that needs mental alertness until you know how this medicine affects you. Do not stand or sit up quickly, especially if you are an older patient. This reduces the risk of dizzy or fainting spells. Alcohol may interfere with the effect of this medicine. Avoid alcoholic drinks.  Your mouth may get dry. Chewing sugarless gum or sucking hard candy, and drinking plenty of water will help. Contact your doctor if the problem does not go away or is severe.  What side effects may I notice from receiving this medicine?  Side effects that you should report to your doctor or health care professional as soon as possible:  -allergic reactions like skin rash, itching or hives, swelling of the face, lips, or tongue  -black, tarry stools  -breathing problems  -changes in vision  -chest pain  -confusion  -eye pain  -fast, irregular heartbeat  -feeling faint or lightheaded, falls  -hallucination, loss of contact with reality  -loss of balance or coordination  -loss of memory  -restlessness, pacing, inability to keep still  -seizures  -stiff  muscles  -suicidal thoughts or other mood changes  -trouble sleeping  -unusual bleeding or bruising  -unusually weak or tired  -vomiting  Side effects that usually do not require medical attention (report to your doctor or health care professional if they continue or are bothersome):  -change in appetite or weight  -change in sex drive or performance  -dizziness  -headache  -increased sweating  -indigestion, nausea  -tremor  This list may not describe all possible side effects. Call your doctor for medical advice about side effects. You may report side effects to FDA at 9-806-FDA-9584.  Where should I keep my medicine?  Keep out of reach of children.  Store at room temperature between 15 and 30 degrees C (59 and 86 degrees F). Throw away any unused medicine after the expiration date.  NOTE: This sheet is a summary. It may not cover all possible information. If you have questions about this medicine, talk to your doctor, pharmacist, or health care provider.     © 2017, [employer]. (2017-02-07 11:45:39)

## 2017-08-28 NOTE — PROGRESS NOTES
Subjective   Isa Vogel is a 51 y.o. female.  Pt is here to fu on a1c.    Pt complains of muffled hearing in left ear that started a few weeks ago.    Pt would like to discuss depressionPt states he stress level is out the roof and right now she could sit here and cry. Pt states she has a lot going on with her daughter and work. Pt states she almost took herself  To hospital on Friday due to being so stressed and everything hitting her at one time.    Diabetes   She presents for her follow-up diabetic visit. She has type 2 diabetes mellitus. No MedicAlert identification noted. The initial diagnosis of diabetes was made 1 year ago. Her disease course has been stable. Hypoglycemia symptoms include nervousness/anxiousness. Pertinent negatives for hypoglycemia include no confusion, dizziness, headaches, hunger, mood changes, pallor, seizures, sleepiness, speech difficulty, sweats or tremors. Pertinent negatives for diabetes include no blurred vision, no chest pain, no fatigue, no foot paresthesias, no foot ulcerations, no polydipsia, no polyphagia, no polyuria, no visual change, no weakness and no weight loss. Pertinent negatives for hypoglycemia complications include no blackouts, no hospitalization, no nocturnal hypoglycemia and no required assistance. Symptoms are stable. Pertinent negatives for diabetic complications include no autonomic neuropathy, CVA, heart disease, impotence, nephropathy, peripheral neuropathy, PVD or retinopathy. Risk factors for coronary artery disease include family history, dyslipidemia, diabetes mellitus, hypertension, obesity, tobacco exposure and sedentary lifestyle. Current diabetic treatment includes oral agent (dual therapy). She is compliant with treatment most of the time. Her weight is increasing steadily. She is following a generally unhealthy diet. When asked about meal planning, she reported none. She has not had a previous visit with a dietitian. She participates in exercise  three times a week. An ACE inhibitor/angiotensin II receptor blocker is being taken. She does not see a podiatrist.Eye exam is current.   Ear Fullness    There is pain in the left ear. This is a new problem. The current episode started 1 to 4 weeks ago. The problem occurs constantly. The problem has been unchanged. There has been no fever. The fever has been present for 5 days or more. The pain is at a severity of 0/10. The patient is experiencing no pain. Associated symptoms include hearing loss. Pertinent negatives include no abdominal pain, coughing, diarrhea, ear discharge, headaches, neck pain, rash, rhinorrhea, sore throat or vomiting. She has tried nothing for the symptoms. The treatment provided no relief. There is no history of a chronic ear infection, hearing loss or a tympanostomy tube.   Depression   Visit Type: initial  Onset of symptoms: at an unknown time  Progression since onset: gradually worsening  Patient presents with the following symptoms: anhedonia, decreased concentration, depressed mood, excessive worry, fatigue, feelings of worthlessness, insomnia, irritability, nervousness/anxiety, panic, restlessness, shortness of breath and weight gain.  Patient is not experiencing: chest pain, choking sensation, compulsions, confusion, dizziness, dry mouth, feelings of hopelessness, hypersomnia, hyperventilation, impotence, malaise, memory impairment, muscle tension, nausea, obsessions, palpitations, psychomotor agitation, psychomotor retardation, suicidal ideas, suicidal planning, thoughts of death and weight loss.  Frequency of symptoms: most days   Severity: moderate   Aggravated by: work stress, family issues and social activities  Sleep per night: 4 hours  Sleep quality: poor  Nighttime awakenings: one to two  Risk factors: emotional abuse and previous episode of depression  Patient has a history of: anxiety/panic attacks, arrhythmia, asthma, chronic lung disease and depression  No history of:  anemia, CAD, CHF, fibromyalgia, hyperthyroidism, suicide attempt, mental illness and substance abuse     depression due to increase stress in home life with daughter.      Fu diabetes.    Muffled left ear 2 weeks no drainage.  No pain.      The following portions of the patient's history were reviewed and updated as appropriate: allergies, current medications, past family history, past medical history, past social history, past surgical history and problem list.    Review of Systems   Constitutional: Positive for irritability and weight gain. Negative for chills, fatigue, fever, unexpected weight change and weight loss.   HENT: Positive for hearing loss. Negative for congestion, ear discharge, ear pain, nosebleeds, rhinorrhea, sinus pressure, sneezing, sore throat and trouble swallowing.    Eyes: Negative for blurred vision, itching and visual disturbance.   Respiratory: Positive for shortness of breath. Negative for cough, choking, chest tightness and wheezing.    Cardiovascular: Negative for chest pain, palpitations and leg swelling.   Gastrointestinal: Negative for abdominal pain, anal bleeding, blood in stool, constipation, diarrhea, nausea and vomiting.   Endocrine: Negative for cold intolerance, heat intolerance, polydipsia, polyphagia and polyuria.   Genitourinary: Negative for difficulty urinating, frequency, hematuria, impotence and urgency.   Musculoskeletal: Negative for back pain, gait problem, myalgias and neck pain.   Skin: Negative for pallor, rash and wound.   Neurological: Negative for dizziness, tremors, seizures, speech difficulty, weakness, numbness and headaches.   Hematological: Negative for adenopathy. Does not bruise/bleed easily.   Psychiatric/Behavioral: Positive for decreased concentration. Negative for agitation, confusion, substance abuse and suicidal ideas. The patient is nervous/anxious and has insomnia.        Objective     Vitals:    08/28/17 1552   BP: 128/84   Pulse: 86   SpO2: 95%  "  Weight: 299 lb 9.6 oz (136 kg)   Height: 68\" (172.7 cm)       Physical Exam   Constitutional: She is oriented to person, place, and time. She appears well-developed and well-nourished.   HENT:   Head: Normocephalic and atraumatic.   Right Ear: External ear normal.   Left Ear: External ear normal.   Nose: Nose normal.   Mouth/Throat: Oropharynx is clear and moist.   Eyes: EOM are normal. Pupils are equal, round, and reactive to light. Right eye exhibits no discharge. Left eye exhibits no discharge.   Neck: Normal range of motion. Neck supple. No tracheal deviation present. No thyromegaly present.   Cardiovascular: Normal rate, regular rhythm, normal heart sounds and intact distal pulses.    No murmur heard.  Pulmonary/Chest: Effort normal and breath sounds normal. No respiratory distress. She has no wheezes. She has no rales.   Abdominal: Soft. Bowel sounds are normal. She exhibits no mass. There is no tenderness.   Musculoskeletal: Normal range of motion. She exhibits edema.        Right shoulder: She exhibits no swelling.   2+ lower extremity edema.   Neurological: She is alert and oriented to person, place, and time. She has normal reflexes.   Skin: Skin is warm and dry. No cyanosis. Nails show no clubbing.   Psychiatric: She has a normal mood and affect. Her behavior is normal. Judgment and thought content normal.   Nursing note and vitals reviewed.      Assessment/Plan     Problem List Items Addressed This Visit        Endocrine    Type 2 diabetes mellitus without complication - Primary    Relevant Orders    POC Glycosylated Hemoglobin (Hb A1C) (Completed)    POC Microalbumin (Completed)       Nervous and Auditory    Dysfunction of left eustachian tube    Relevant Medications    fluticasone (FLONASE) 50 MCG/ACT nasal spray       Other    Reactive depression    Relevant Medications    citalopram (CELEXA) 20 MG tablet        Celexa 20 mg per day added due to increased symptoms of depression.    Flonase 50mcg/act " nasal spray ordered due to middle ear effusion and seasonal allergic rhinitis.     Hbg A1C was 7.3 today. Patient to continue dual therapy diabetes treatment with diet and exercise modifications.     Patient to follow-up in 5 weeks.

## 2017-09-02 DIAGNOSIS — I10 ESSENTIAL HYPERTENSION: ICD-10-CM

## 2017-09-02 DIAGNOSIS — E78.5 HYPERLIPIDEMIA LDL GOAL <70: ICD-10-CM

## 2017-09-02 RX ORDER — SIMVASTATIN 20 MG
TABLET ORAL
Qty: 90 TABLET | Refills: 3 | Status: SHIPPED | OUTPATIENT
Start: 2017-09-02 | End: 2019-01-04 | Stop reason: SDUPTHER

## 2017-09-02 RX ORDER — METFORMIN HYDROCHLORIDE EXTENDED-RELEASE TABLETS 1000 MG/1
TABLET, FILM COATED, EXTENDED RELEASE ORAL
Qty: 30 TABLET | Refills: 3 | Status: SHIPPED | OUTPATIENT
Start: 2017-09-02 | End: 2018-02-11 | Stop reason: SDUPTHER

## 2017-09-02 RX ORDER — LOSARTAN POTASSIUM AND HYDROCHLOROTHIAZIDE 25; 100 MG/1; MG/1
TABLET ORAL
Qty: 30 TABLET | Refills: 3 | Status: SHIPPED | OUTPATIENT
Start: 2017-09-02 | End: 2018-02-11 | Stop reason: SDUPTHER

## 2017-10-14 DIAGNOSIS — E11.9 TYPE 2 DIABETES MELLITUS WITHOUT COMPLICATION, WITHOUT LONG-TERM CURRENT USE OF INSULIN (HCC): ICD-10-CM

## 2017-10-16 RX ORDER — EMPAGLIFLOZIN 25 MG/1
TABLET, FILM COATED ORAL
Qty: 30 TABLET | Refills: 0 | Status: SHIPPED | OUTPATIENT
Start: 2017-10-16 | End: 2017-11-12 | Stop reason: SDUPTHER

## 2017-11-12 DIAGNOSIS — E11.9 TYPE 2 DIABETES MELLITUS WITHOUT COMPLICATION, WITHOUT LONG-TERM CURRENT USE OF INSULIN (HCC): ICD-10-CM

## 2017-11-13 RX ORDER — EMPAGLIFLOZIN 25 MG/1
TABLET, FILM COATED ORAL
Qty: 30 TABLET | Refills: 0 | Status: SHIPPED | OUTPATIENT
Start: 2017-11-13 | End: 2017-12-17 | Stop reason: SDUPTHER

## 2017-11-22 DIAGNOSIS — R10.84 GENERALIZED ABDOMINAL PAIN: ICD-10-CM

## 2017-11-22 RX ORDER — PANTOPRAZOLE SODIUM 40 MG/1
TABLET, DELAYED RELEASE ORAL
Qty: 90 TABLET | Refills: 0 | Status: SHIPPED | OUTPATIENT
Start: 2017-11-22 | End: 2018-03-20 | Stop reason: SDUPTHER

## 2017-12-07 ENCOUNTER — OFFICE VISIT (OUTPATIENT)
Dept: FAMILY MEDICINE CLINIC | Facility: CLINIC | Age: 52
End: 2017-12-07

## 2017-12-07 VITALS
TEMPERATURE: 98.1 F | HEIGHT: 68 IN | WEIGHT: 293 LBS | OXYGEN SATURATION: 97 % | BODY MASS INDEX: 44.41 KG/M2 | SYSTOLIC BLOOD PRESSURE: 126 MMHG | HEART RATE: 74 BPM | RESPIRATION RATE: 16 BRPM | DIASTOLIC BLOOD PRESSURE: 80 MMHG

## 2017-12-07 DIAGNOSIS — R10.32 LEFT LOWER QUADRANT PAIN: ICD-10-CM

## 2017-12-07 DIAGNOSIS — R10.9 ABDOMINAL PRESSURE: Primary | ICD-10-CM

## 2017-12-07 DIAGNOSIS — E11.9 TYPE 2 DIABETES MELLITUS WITHOUT COMPLICATION, WITHOUT LONG-TERM CURRENT USE OF INSULIN (HCC): ICD-10-CM

## 2017-12-07 LAB
ALBUMIN SERPL-MCNC: 4.4 G/DL (ref 3.2–4.8)
ALBUMIN/GLOB SERPL: 1.7 G/DL (ref 1.5–2.5)
ALP SERPL-CCNC: 72 U/L (ref 25–100)
ALT SERPL W P-5'-P-CCNC: 32 U/L (ref 7–40)
AMYLASE SERPL-CCNC: 51 U/L (ref 30–118)
ANION GAP SERPL CALCULATED.3IONS-SCNC: 6 MMOL/L (ref 3–11)
AST SERPL-CCNC: 28 U/L (ref 0–33)
BASOPHILS # BLD AUTO: 0.07 10*3/MM3 (ref 0–0.2)
BASOPHILS NFR BLD AUTO: 0.6 % (ref 0–1)
BILIRUB BLD-MCNC: NEGATIVE MG/DL
BILIRUB SERPL-MCNC: 0.5 MG/DL (ref 0.3–1.2)
BUN BLD-MCNC: 16 MG/DL (ref 9–23)
BUN/CREAT SERPL: 14.5 (ref 7–25)
CALCIUM SPEC-SCNC: 9.5 MG/DL (ref 8.7–10.4)
CHLORIDE SERPL-SCNC: 103 MMOL/L (ref 99–109)
CLARITY, POC: CLEAR
CO2 SERPL-SCNC: 28 MMOL/L (ref 20–31)
COLOR UR: YELLOW
CREAT BLD-MCNC: 1.1 MG/DL (ref 0.6–1.3)
DEPRECATED RDW RBC AUTO: 44.5 FL (ref 37–54)
EOSINOPHIL # BLD AUTO: 0.14 10*3/MM3 (ref 0–0.3)
EOSINOPHIL NFR BLD AUTO: 1.2 % (ref 0–3)
ERYTHROCYTE [DISTWIDTH] IN BLOOD BY AUTOMATED COUNT: 12.8 % (ref 11.3–14.5)
GFR SERPL CREATININE-BSD FRML MDRD: 52 ML/MIN/1.73
GLOBULIN UR ELPH-MCNC: 2.6 GM/DL
GLUCOSE BLD-MCNC: 115 MG/DL (ref 70–100)
GLUCOSE UR STRIP-MCNC: ABNORMAL MG/DL
HBA1C MFR BLD: 6.9 %
HCT VFR BLD AUTO: 45.7 % (ref 34.5–44)
HGB BLD-MCNC: 15.2 G/DL (ref 11.5–15.5)
IMM GRANULOCYTES # BLD: 0.03 10*3/MM3 (ref 0–0.03)
IMM GRANULOCYTES NFR BLD: 0.3 % (ref 0–0.6)
KETONES UR QL: NEGATIVE
LEUKOCYTE EST, POC: NEGATIVE
LIPASE SERPL-CCNC: 45 U/L (ref 6–51)
LYMPHOCYTES # BLD AUTO: 3.85 10*3/MM3 (ref 0.6–4.8)
LYMPHOCYTES NFR BLD AUTO: 32.3 % (ref 24–44)
MCH RBC QN AUTO: 31.6 PG (ref 27–31)
MCHC RBC AUTO-ENTMCNC: 33.3 G/DL (ref 32–36)
MCV RBC AUTO: 95 FL (ref 80–99)
MONOCYTES # BLD AUTO: 0.92 10*3/MM3 (ref 0–1)
MONOCYTES NFR BLD AUTO: 7.7 % (ref 0–12)
NEUTROPHILS # BLD AUTO: 6.92 10*3/MM3 (ref 1.5–8.3)
NEUTROPHILS NFR BLD AUTO: 57.9 % (ref 41–71)
NITRITE UR-MCNC: NEGATIVE MG/ML
PH UR: 5.5 [PH] (ref 5–8)
PLATELET # BLD AUTO: 324 10*3/MM3 (ref 150–450)
PMV BLD AUTO: 10.9 FL (ref 6–12)
POTASSIUM BLD-SCNC: 4.1 MMOL/L (ref 3.5–5.5)
PROT SERPL-MCNC: 7 G/DL (ref 5.7–8.2)
PROT UR STRIP-MCNC: NEGATIVE MG/DL
RBC # BLD AUTO: 4.81 10*6/MM3 (ref 3.89–5.14)
RBC # UR STRIP: NEGATIVE /UL
SODIUM BLD-SCNC: 137 MMOL/L (ref 132–146)
SP GR UR: 1.01 (ref 1–1.03)
UROBILINOGEN UR QL: NORMAL
WBC NRBC COR # BLD: 11.93 10*3/MM3 (ref 3.5–10.8)

## 2017-12-07 PROCEDURE — 99214 OFFICE O/P EST MOD 30 MIN: CPT | Performed by: NURSE PRACTITIONER

## 2017-12-07 PROCEDURE — 83690 ASSAY OF LIPASE: CPT | Performed by: NURSE PRACTITIONER

## 2017-12-07 PROCEDURE — 80053 COMPREHEN METABOLIC PANEL: CPT | Performed by: NURSE PRACTITIONER

## 2017-12-07 PROCEDURE — 81003 URINALYSIS AUTO W/O SCOPE: CPT | Performed by: NURSE PRACTITIONER

## 2017-12-07 PROCEDURE — 82150 ASSAY OF AMYLASE: CPT | Performed by: NURSE PRACTITIONER

## 2017-12-07 PROCEDURE — 83036 HEMOGLOBIN GLYCOSYLATED A1C: CPT | Performed by: NURSE PRACTITIONER

## 2017-12-07 PROCEDURE — 85025 COMPLETE CBC W/AUTO DIFF WBC: CPT | Performed by: NURSE PRACTITIONER

## 2017-12-07 RX ORDER — CIPROFLOXACIN 500 MG/1
500 TABLET, FILM COATED ORAL 2 TIMES DAILY
Qty: 28 TABLET | Refills: 0 | Status: SHIPPED | OUTPATIENT
Start: 2017-12-07 | End: 2018-01-09

## 2017-12-07 RX ORDER — METRONIDAZOLE 500 MG/1
500 TABLET ORAL 3 TIMES DAILY
Qty: 21 TABLET | Refills: 0 | Status: SHIPPED | OUTPATIENT
Start: 2017-12-07 | End: 2018-01-09

## 2017-12-07 NOTE — PROGRESS NOTES
Subjective   Isa Vogel is a 52 y.o. female.     History of Present Illness Patient is complaining of sudden onset of LLQ pain in the last 4 hours. No fever, +nausea. No vomiting. Her baseline stool is diarrhea since her bonita in June. No hematochezia or melena. No known sick contacts. No travel. No treatment. Colonoscopy 12/16 with polyp and diverticulitis.    The following portions of the patient's history were reviewed and updated as appropriate: allergies, current medications, past family history, past medical history, past social history, past surgical history and problem list.    Review of Systems   Constitutional: Negative for fatigue, fever and unexpected weight change.   HENT: Negative for congestion, hearing loss, nosebleeds, rhinorrhea, sore throat, trouble swallowing and voice change.    Eyes: Negative for pain, discharge, redness and visual disturbance.   Respiratory: Negative for cough, chest tightness, shortness of breath and wheezing.    Cardiovascular: Negative for chest pain, palpitations and leg swelling.   Gastrointestinal: Positive for abdominal pain and nausea. Negative for abdominal distention, anal bleeding, blood in stool, constipation, diarrhea and vomiting.   Endocrine: Negative for cold intolerance, heat intolerance, polydipsia, polyphagia and polyuria.   Genitourinary: Negative for dysuria, flank pain, frequency and hematuria.   Musculoskeletal: Negative for arthralgias, gait problem, joint swelling and myalgias.   Skin: Negative for color change and rash.   Neurological: Negative for dizziness, tremors, seizures, syncope, speech difficulty, weakness, numbness and headaches.   Hematological: Negative.    Psychiatric/Behavioral: Negative.        Objective   Physical Exam   Constitutional: She is oriented to person, place, and time. She appears well-developed and well-nourished. No distress.   HENT:   Head: Normocephalic and atraumatic.   Right Ear: Tympanic membrane and external ear  normal.   Left Ear: Tympanic membrane and external ear normal.   Nose: Nose normal.   Mouth/Throat: Oropharynx is clear and moist. No oropharyngeal exudate.   Eyes: Conjunctivae are normal. Pupils are equal, round, and reactive to light. Right eye exhibits no discharge. Left eye exhibits no discharge. No scleral icterus.   Neck: Neck supple. No tracheal deviation present. No thyromegaly present.   Cardiovascular: Normal rate, regular rhythm and normal heart sounds.  Exam reveals no gallop and no friction rub.    No murmur heard.  Pulmonary/Chest: Effort normal and breath sounds normal. No respiratory distress. She has no wheezes.   Abdominal: Soft. Bowel sounds are normal. She exhibits no distension and no mass. There is tenderness in the left upper quadrant and left lower quadrant. There is no rebound and no guarding.   Musculoskeletal: She exhibits no edema or deformity.   Lymphadenopathy:     She has no cervical adenopathy.   Neurological: She is alert and oriented to person, place, and time. Coordination normal.   Skin: Skin is warm and dry. No rash noted. No erythema.   Psychiatric: She has a normal mood and affect. Her speech is normal and behavior is normal. Judgment and thought content normal.   Nursing note and vitals reviewed.      Assessment/Plan   Isa was seen today for abdominal pain.    Diagnoses and all orders for this visit:    Abdominal pressure  -     POCT urinalysis dipstick, automated    Left lower quadrant pain  -     CBC & Differential  -     Amylase  -     Comprehensive Metabolic Panel  -     Lipase  -     metroNIDAZOLE (FLAGYL) 500 MG tablet; Take 1 tablet by mouth 3 (Three) Times a Day.  -     ciprofloxacin (CIPRO) 500 MG tablet; Take 1 tablet by mouth 2 (Two) Times a Day.    Type 2 diabetes mellitus without complication, without long-term current use of insulin  -     POC Glycosylated Hemoglobin (Hb A1C)    UA neg for blood and bacteria.  Will treat for diverticulitis.  Reviewed  colonoscopy report.  Increase fluids.  Follow up symptoms persist or worsen in next 24-48 hours.  I personally spent over half of a total 25 minutes face to face with the patient in counseling and discussion and/or coordination of care as described above.

## 2017-12-08 ENCOUNTER — TELEPHONE (OUTPATIENT)
Dept: FAMILY MEDICINE CLINIC | Facility: CLINIC | Age: 52
End: 2017-12-08

## 2017-12-08 NOTE — TELEPHONE ENCOUNTER
Spoke with patient this am. She is still in pain but not worse. Took one dose of antibiotic last night. Will monitor her symptoms through the day and come in if symptoms persist or worsen.

## 2017-12-17 DIAGNOSIS — E11.9 TYPE 2 DIABETES MELLITUS WITHOUT COMPLICATION, WITHOUT LONG-TERM CURRENT USE OF INSULIN (HCC): ICD-10-CM

## 2017-12-18 RX ORDER — EMPAGLIFLOZIN 25 MG/1
TABLET, FILM COATED ORAL
Qty: 30 TABLET | Refills: 0 | Status: SHIPPED | OUTPATIENT
Start: 2017-12-18 | End: 2018-02-11 | Stop reason: SDUPTHER

## 2018-01-09 ENCOUNTER — OFFICE VISIT (OUTPATIENT)
Dept: FAMILY MEDICINE CLINIC | Facility: CLINIC | Age: 53
End: 2018-01-09

## 2018-01-09 VITALS
BODY MASS INDEX: 44.41 KG/M2 | HEIGHT: 68 IN | SYSTOLIC BLOOD PRESSURE: 122 MMHG | HEART RATE: 60 BPM | DIASTOLIC BLOOD PRESSURE: 80 MMHG | RESPIRATION RATE: 16 BRPM | OXYGEN SATURATION: 97 % | TEMPERATURE: 97.8 F | WEIGHT: 293 LBS

## 2018-01-09 DIAGNOSIS — H66.91 OTITIS, RIGHT: Primary | ICD-10-CM

## 2018-01-09 DIAGNOSIS — G51.0 BELL'S PALSY: ICD-10-CM

## 2018-01-09 PROCEDURE — 99213 OFFICE O/P EST LOW 20 MIN: CPT | Performed by: NURSE PRACTITIONER

## 2018-01-09 RX ORDER — METHYLPREDNISOLONE 4 MG/1
TABLET ORAL
Qty: 21 TABLET | Refills: 0 | Status: SHIPPED | OUTPATIENT
Start: 2018-01-09 | End: 2018-04-09

## 2018-01-09 RX ORDER — AMOXICILLIN AND CLAVULANATE POTASSIUM 500; 125 MG/1; MG/1
1 TABLET, FILM COATED ORAL 2 TIMES DAILY
Qty: 20 TABLET | Refills: 0 | Status: SHIPPED | OUTPATIENT
Start: 2018-01-09 | End: 2018-04-09

## 2018-01-09 NOTE — PROGRESS NOTES
Subjective   Isa Vogel is a 52 y.o. female.     History of Present Illness Complains of headache for one week. Pain is bi-temporal. Has been exposed to 6 pts with flu. 3.5 weeks ago too tamiflu. Also complains of left sided face numnbess. Has a history of bell's palsy on that same side. Occasional cough. No nasal discharge or congestion. No sore throat. No fever. Fell getting off the couch yesterday. Yesterday glucose was 138. She is eating. Bowels and bladder without problems. Feels like her hands are swollen.    The following portions of the patient's history were reviewed and updated as appropriate: allergies, current medications, past family history, past medical history, past social history, past surgical history and problem list.    Review of Systems   Constitutional: Negative for fatigue, fever and unexpected weight change.   HENT: Positive for ear pain. Negative for congestion, hearing loss, nosebleeds, rhinorrhea, sore throat, trouble swallowing and voice change.    Eyes: Negative for pain, discharge, redness and visual disturbance.   Respiratory: Negative for cough, chest tightness, shortness of breath and wheezing.    Cardiovascular: Negative for chest pain, palpitations and leg swelling.   Gastrointestinal: Negative for abdominal distention, abdominal pain, anal bleeding, blood in stool, constipation, diarrhea, nausea and vomiting.   Endocrine: Negative for cold intolerance, heat intolerance, polydipsia, polyphagia and polyuria.   Genitourinary: Negative for dysuria, flank pain, frequency and hematuria.   Musculoskeletal: Negative for arthralgias, gait problem, joint swelling and myalgias.   Skin: Negative for color change and rash.   Neurological: Positive for dizziness, numbness and headaches. Negative for tremors, seizures, syncope, speech difficulty and weakness.   Hematological: Negative.    Psychiatric/Behavioral: Negative.        Objective   Physical Exam   Constitutional: She is oriented to  person, place, and time. She appears well-developed and well-nourished. No distress.   HENT:   Head: Normocephalic and atraumatic.   Right Ear: External ear normal. Tympanic membrane is erythematous.   Left Ear: Tympanic membrane and external ear normal.   Nose: Nose normal.   Mouth/Throat: Oropharynx is clear and moist. No oropharyngeal exudate.   Eyes: Conjunctivae are normal. Pupils are equal, round, and reactive to light. Right eye exhibits no discharge. Left eye exhibits no discharge. No scleral icterus.   Neck: Neck supple. No tracheal deviation present. No thyromegaly present.   Cardiovascular: Normal rate, regular rhythm and normal heart sounds.  Exam reveals no gallop and no friction rub.    No murmur heard.  Pulmonary/Chest: Effort normal and breath sounds normal. No respiratory distress. She has no wheezes.   Abdominal: Soft. Bowel sounds are normal. She exhibits no distension and no mass. There is no tenderness.   Musculoskeletal: She exhibits no edema or deformity.   Lymphadenopathy:     She has no cervical adenopathy.   Neurological: She is alert and oriented to person, place, and time. Coordination normal.   AXOX3. Speech is coherent. Facies with decreased movement of right eyebrow. No pronator drift. Strength is 5/5 bilat UE and LE. Neg Romberg.     Skin: Skin is warm and dry. No rash noted. No erythema.   Psychiatric: She has a normal mood and affect. Her speech is normal and behavior is normal. Judgment and thought content normal.   Nursing note and vitals reviewed.      Assessment/Plan   Isa was seen today for headache.    Diagnoses and all orders for this visit:    Otitis, right  -     MethylPREDNISolone (MEDROL, FOX,) 4 MG tablet; Take as directed on package instructions.  -     amoxicillin-clavulanate (AUGMENTIN) 500-125 MG per tablet; Take 1 tablet by mouth 2 (Two) Times a Day.    Bell's palsy  -     MethylPREDNISolone (MEDROL, FOX,) 4 MG tablet; Take as directed on package  instructions.    Discussed the nature of the disease including, risks, complications, implications, management, safe and proper use of medications. Encouraged therapeutic lifestyle changes including low calorie diet with plenty of fruits and vegetables, daily exercise, medication compliance, and keeping scheduled follow up appointments with me and any other providers. Encouraged patient to have appointment for complete physical, fasting labs, appropriate screenings, and immunizations on an annual basis.  Follow up symptoms persist or worsen.

## 2018-02-11 DIAGNOSIS — F32.9 REACTIVE DEPRESSION: ICD-10-CM

## 2018-02-11 DIAGNOSIS — E11.9 TYPE 2 DIABETES MELLITUS WITHOUT COMPLICATION, WITHOUT LONG-TERM CURRENT USE OF INSULIN (HCC): ICD-10-CM

## 2018-02-11 DIAGNOSIS — I10 ESSENTIAL HYPERTENSION: ICD-10-CM

## 2018-02-11 RX ORDER — CITALOPRAM 20 MG/1
TABLET ORAL
Qty: 30 TABLET | Refills: 5 | Status: SHIPPED | OUTPATIENT
Start: 2018-02-11 | End: 2019-02-17 | Stop reason: SDUPTHER

## 2018-02-11 RX ORDER — METFORMIN HYDROCHLORIDE EXTENDED-RELEASE TABLETS 1000 MG/1
TABLET, FILM COATED, EXTENDED RELEASE ORAL
Qty: 30 TABLET | Refills: 5 | Status: SHIPPED | OUTPATIENT
Start: 2018-02-11 | End: 2019-02-17 | Stop reason: SDUPTHER

## 2018-02-11 RX ORDER — EMPAGLIFLOZIN 25 MG/1
TABLET, FILM COATED ORAL
Qty: 30 TABLET | Refills: 5 | Status: SHIPPED | OUTPATIENT
Start: 2018-02-11 | End: 2018-12-11 | Stop reason: SINTOL

## 2018-02-11 RX ORDER — LOSARTAN POTASSIUM AND HYDROCHLOROTHIAZIDE 25; 100 MG/1; MG/1
TABLET ORAL
Qty: 30 TABLET | Refills: 5 | Status: SHIPPED | OUTPATIENT
Start: 2018-02-11 | End: 2018-12-11 | Stop reason: SINTOL

## 2018-03-20 DIAGNOSIS — R10.84 GENERALIZED ABDOMINAL PAIN: ICD-10-CM

## 2018-03-20 RX ORDER — PANTOPRAZOLE SODIUM 40 MG/1
TABLET, DELAYED RELEASE ORAL
Qty: 90 TABLET | Refills: 0 | Status: SHIPPED | OUTPATIENT
Start: 2018-03-20 | End: 2018-06-25 | Stop reason: SDUPTHER

## 2018-04-09 ENCOUNTER — OFFICE VISIT (OUTPATIENT)
Dept: FAMILY MEDICINE CLINIC | Facility: CLINIC | Age: 53
End: 2018-04-09

## 2018-04-09 VITALS
BODY MASS INDEX: 44.41 KG/M2 | DIASTOLIC BLOOD PRESSURE: 66 MMHG | HEIGHT: 68 IN | WEIGHT: 293 LBS | SYSTOLIC BLOOD PRESSURE: 112 MMHG | TEMPERATURE: 99 F

## 2018-04-09 DIAGNOSIS — B37.31 YEAST VAGINITIS: ICD-10-CM

## 2018-04-09 DIAGNOSIS — M54.50 ACUTE MIDLINE LOW BACK PAIN WITHOUT SCIATICA: Primary | ICD-10-CM

## 2018-04-09 DIAGNOSIS — R82.90 URINE ABNORMALITY: ICD-10-CM

## 2018-04-09 DIAGNOSIS — J40 BRONCHITIS: ICD-10-CM

## 2018-04-09 LAB
BILIRUB BLD-MCNC: NEGATIVE MG/DL
CLARITY, POC: CLEAR
COLOR UR: YELLOW
EXPIRATION DATE: ABNORMAL
GLUCOSE UR STRIP-MCNC: ABNORMAL MG/DL
KETONES UR QL: NEGATIVE
LEUKOCYTE EST, POC: NEGATIVE
Lab: ABNORMAL
NITRITE UR-MCNC: NEGATIVE MG/ML
PH UR: 6 [PH] (ref 5–8)
PROT UR STRIP-MCNC: ABNORMAL MG/DL
RBC # UR STRIP: ABNORMAL /UL
SP GR UR: 1.02 (ref 1–1.03)
UROBILINOGEN UR QL: NORMAL

## 2018-04-09 PROCEDURE — 99214 OFFICE O/P EST MOD 30 MIN: CPT | Performed by: FAMILY MEDICINE

## 2018-04-09 PROCEDURE — 87086 URINE CULTURE/COLONY COUNT: CPT | Performed by: FAMILY MEDICINE

## 2018-04-09 PROCEDURE — 81003 URINALYSIS AUTO W/O SCOPE: CPT | Performed by: FAMILY MEDICINE

## 2018-04-09 RX ORDER — AZITHROMYCIN 250 MG/1
TABLET, FILM COATED ORAL
Qty: 6 TABLET | Refills: 0 | Status: SHIPPED | OUTPATIENT
Start: 2018-04-09 | End: 2018-12-11

## 2018-04-09 RX ORDER — FLUCONAZOLE 150 MG/1
150 TABLET ORAL ONCE
Qty: 1 TABLET | Refills: 0 | Status: SHIPPED | OUTPATIENT
Start: 2018-04-09 | End: 2018-04-09

## 2018-04-09 RX ORDER — NITROFURANTOIN 25; 75 MG/1; MG/1
100 CAPSULE ORAL EVERY 12 HOURS SCHEDULED
Qty: 14 CAPSULE | Refills: 0 | Status: SHIPPED | OUTPATIENT
Start: 2018-04-09 | End: 2018-12-11

## 2018-04-09 RX ORDER — BENZONATATE 100 MG/1
100 CAPSULE ORAL 3 TIMES DAILY PRN
Qty: 40 CAPSULE | Refills: 0 | Status: SHIPPED | OUTPATIENT
Start: 2018-04-09 | End: 2018-12-11

## 2018-04-09 RX ORDER — DEXTROMETHORPHAN HYDROBROMIDE AND PROMETHAZINE HYDROCHLORIDE 15; 6.25 MG/5ML; MG/5ML
5 SYRUP ORAL 4 TIMES DAILY PRN
Qty: 118 ML | Refills: 0 | Status: SHIPPED | OUTPATIENT
Start: 2018-04-09 | End: 2018-12-11

## 2018-04-09 NOTE — PROGRESS NOTES
Subjective   Isa Vogel is a 52 y.o. female.     Pt thinks has UTI. Low back pain started Thursday. No other urinary symptoms.    Pt also thinks she has URI. Pt complains productive cough and congestion, runny nose    Back Pain   This is a new problem. The current episode started in the past 7 days. The problem occurs intermittently. The problem has been waxing and waning since onset. The pain is present in the lumbar spine. The quality of the pain is described as aching. Radiates to: right flank. The pain is at a severity of 2/10. The pain is mild. The pain is worse during the day. The symptoms are aggravated by coughing. Associated symptoms include a fever. Pertinent negatives include no abdominal pain, bladder incontinence, bowel incontinence, chest pain, dysuria, headaches, tingling, weakness or weight loss. She has tried nothing for the symptoms. The treatment provided mild relief.   Cough   This is a new problem. The current episode started in the past 7 days. The cough is productive of sputum. Associated symptoms include ear pain, a fever, myalgias and postnasal drip. Pertinent negatives include no chest pain, ear congestion, headaches, heartburn, sore throat or weight loss. Nothing aggravates the symptoms. The treatment provided no relief. There is no history of asthma, bronchiectasis, bronchitis, COPD, emphysema, environmental allergies or pneumonia.        The following portions of the patient's history were reviewed and updated as appropriate: allergies, current medications, past family history, past medical history, past social history, past surgical history and problem list.    Review of Systems   Constitutional: Positive for fever. Negative for weight loss.   HENT: Positive for ear pain and postnasal drip. Negative for sore throat.    Respiratory: Positive for cough.    Cardiovascular: Negative for chest pain.   Gastrointestinal: Negative for abdominal pain, bowel incontinence and heartburn.  "  Genitourinary: Negative for bladder incontinence and dysuria.   Musculoskeletal: Positive for back pain and myalgias.   Allergic/Immunologic: Negative for environmental allergies.   Neurological: Negative for tingling, weakness and headaches.       Objective     Vitals:    04/09/18 1124   BP: 112/66   Temp: 99 °F (37.2 °C)   Weight: 134 kg (296 lb)   Height: 172.7 cm (68\")       Physical Exam   Constitutional: She is oriented to person, place, and time. She appears well-developed and well-nourished.   HENT:   Head: Normocephalic and atraumatic.   Right Ear: External ear normal.   Nose: Nose normal.   Mouth/Throat: Oropharynx is clear and moist.   Left TM dull injected and bulging.     Eyes: EOM are normal. Pupils are equal, round, and reactive to light. Right eye exhibits no discharge. Left eye exhibits no discharge.   Neck: Normal range of motion. Neck supple.   Cardiovascular: Normal rate, regular rhythm, normal heart sounds and intact distal pulses.    Pulmonary/Chest: Effort normal and breath sounds normal.   Abdominal: Soft. Bowel sounds are normal. She exhibits no mass. There is no tenderness.   No abdominal tenderness no CVA tenderness   Musculoskeletal: Normal range of motion.        Right shoulder: She exhibits no swelling.   Neurological: She is alert and oriented to person, place, and time. She has normal reflexes.   Skin: Skin is warm and dry. No cyanosis. Nails show no clubbing.   Psychiatric: She has a normal mood and affect. Her behavior is normal. Judgment and thought content normal.   Nursing note and vitals reviewed.      Assessment/Plan     Problem List Items Addressed This Visit        Respiratory    Bronchitis    Relevant Medications    azithromycin (ZITHROMAX) 250 MG tablet    promethazine-dextromethorphan (PROMETHAZINE-DM) 6.25-15 MG/5ML syrup    benzonatate (TESSALON PERLES) 100 MG capsule       Nervous and Auditory    Acute midline low back pain without sciatica - Primary    Relevant " Medications    nitrofurantoin, macrocrystal-monohydrate, (MACROBID) 100 MG capsule    Other Relevant Orders    POC Urinalysis Dipstick, Automated (Completed)    Urine Culture - Urine, Urine, Clean Catch       Genitourinary    Yeast vaginitis    Relevant Medications    fluconazole (DIFLUCAN) 150 MG tablet       Other    Urine abnormality    Relevant Medications    nitrofurantoin, macrocrystal-monohydrate, (MACROBID) 100 MG capsule      Other Visit Diagnoses    None.

## 2018-04-11 LAB — BACTERIA SPEC AEROBE CULT: NORMAL

## 2018-06-25 DIAGNOSIS — R10.84 GENERALIZED ABDOMINAL PAIN: ICD-10-CM

## 2018-06-25 RX ORDER — PANTOPRAZOLE SODIUM 40 MG/1
TABLET, DELAYED RELEASE ORAL
Qty: 90 TABLET | Refills: 0 | Status: SHIPPED | OUTPATIENT
Start: 2018-06-25 | End: 2019-02-23 | Stop reason: SDUPTHER

## 2018-12-11 ENCOUNTER — OFFICE VISIT (OUTPATIENT)
Dept: FAMILY MEDICINE CLINIC | Facility: CLINIC | Age: 53
End: 2018-12-11

## 2018-12-11 VITALS
TEMPERATURE: 97.7 F | SYSTOLIC BLOOD PRESSURE: 118 MMHG | BODY MASS INDEX: 44.41 KG/M2 | HEART RATE: 66 BPM | OXYGEN SATURATION: 96 % | DIASTOLIC BLOOD PRESSURE: 76 MMHG | HEIGHT: 68 IN | RESPIRATION RATE: 18 BRPM | WEIGHT: 293 LBS

## 2018-12-11 DIAGNOSIS — I51.9 HEART DISEASE: ICD-10-CM

## 2018-12-11 DIAGNOSIS — E11.9 TYPE 2 DIABETES MELLITUS WITHOUT COMPLICATION, WITHOUT LONG-TERM CURRENT USE OF INSULIN (HCC): ICD-10-CM

## 2018-12-11 DIAGNOSIS — E78.5 HYPERLIPIDEMIA LDL GOAL <70: ICD-10-CM

## 2018-12-11 DIAGNOSIS — I10 ESSENTIAL HYPERTENSION: Primary | ICD-10-CM

## 2018-12-11 DIAGNOSIS — J41.0 SIMPLE CHRONIC BRONCHITIS (HCC): ICD-10-CM

## 2018-12-11 DIAGNOSIS — B37.9 YEAST INFECTION: ICD-10-CM

## 2018-12-11 LAB
ALBUMIN SERPL-MCNC: 4.46 G/DL (ref 3.2–4.8)
ALBUMIN/GLOB SERPL: 2.2 G/DL (ref 1.5–2.5)
ALP SERPL-CCNC: 82 U/L (ref 25–100)
ALT SERPL W P-5'-P-CCNC: 46 U/L (ref 7–40)
ANION GAP SERPL CALCULATED.3IONS-SCNC: 7 MMOL/L (ref 3–11)
ARTICHOKE IGE QN: 128 MG/DL (ref 0–130)
AST SERPL-CCNC: 38 U/L (ref 0–33)
BASOPHILS # BLD AUTO: 0.05 10*3/MM3 (ref 0–0.2)
BASOPHILS NFR BLD AUTO: 0.5 % (ref 0–1)
BILIRUB BLD-MCNC: NEGATIVE MG/DL
BILIRUB SERPL-MCNC: 0.5 MG/DL (ref 0.3–1.2)
BUN BLD-MCNC: 15 MG/DL (ref 9–23)
BUN/CREAT SERPL: 14 (ref 7–25)
CALCIUM SPEC-SCNC: 9.8 MG/DL (ref 8.7–10.4)
CHLORIDE SERPL-SCNC: 99 MMOL/L (ref 99–109)
CHOLEST SERPL-MCNC: 182 MG/DL (ref 0–200)
CLARITY, POC: ABNORMAL
CO2 SERPL-SCNC: 30 MMOL/L (ref 20–31)
COLOR UR: YELLOW
CREAT BLD-MCNC: 1.07 MG/DL (ref 0.6–1.3)
DEPRECATED RDW RBC AUTO: 45.3 FL (ref 37–54)
EOSINOPHIL # BLD AUTO: 0.17 10*3/MM3 (ref 0–0.3)
EOSINOPHIL NFR BLD AUTO: 1.6 % (ref 0–3)
ERYTHROCYTE [DISTWIDTH] IN BLOOD BY AUTOMATED COUNT: 12.9 % (ref 11.3–14.5)
GFR SERPL CREATININE-BSD FRML MDRD: 54 ML/MIN/1.73
GLOBULIN UR ELPH-MCNC: 2 GM/DL
GLUCOSE BLD-MCNC: 179 MG/DL (ref 70–100)
GLUCOSE UR STRIP-MCNC: ABNORMAL MG/DL
HBA1C MFR BLD: 10.4 %
HCT VFR BLD AUTO: 47.4 % (ref 34.5–44)
HDLC SERPL-MCNC: 42 MG/DL (ref 40–60)
HGB BLD-MCNC: 15.6 G/DL (ref 11.5–15.5)
IMM GRANULOCYTES # BLD: 0.05 10*3/MM3 (ref 0–0.03)
IMM GRANULOCYTES NFR BLD: 0.5 % (ref 0–0.6)
KETONES UR QL: NEGATIVE
LEUKOCYTE EST, POC: NEGATIVE
LYMPHOCYTES # BLD AUTO: 3.73 10*3/MM3 (ref 0.6–4.8)
LYMPHOCYTES NFR BLD AUTO: 35 % (ref 24–44)
MCH RBC QN AUTO: 32 PG (ref 27–31)
MCHC RBC AUTO-ENTMCNC: 32.9 G/DL (ref 32–36)
MCV RBC AUTO: 97.1 FL (ref 80–99)
MONOCYTES # BLD AUTO: 0.69 10*3/MM3 (ref 0–1)
MONOCYTES NFR BLD AUTO: 6.5 % (ref 0–12)
NEUTROPHILS # BLD AUTO: 6.02 10*3/MM3 (ref 1.5–8.3)
NEUTROPHILS NFR BLD AUTO: 56.4 % (ref 41–71)
NITRITE UR-MCNC: NEGATIVE MG/ML
PH UR: 5.5 [PH] (ref 5–8)
PLATELET # BLD AUTO: 335 10*3/MM3 (ref 150–450)
PMV BLD AUTO: 11.2 FL (ref 6–12)
POC CREATININE URINE: 300
POC MICROALBUMIN URINE: 10
POTASSIUM BLD-SCNC: 4.2 MMOL/L (ref 3.5–5.5)
PROT SERPL-MCNC: 6.5 G/DL (ref 5.7–8.2)
PROT UR STRIP-MCNC: NEGATIVE MG/DL
RBC # BLD AUTO: 4.88 10*6/MM3 (ref 3.89–5.14)
RBC # UR STRIP: ABNORMAL /UL
SODIUM BLD-SCNC: 136 MMOL/L (ref 132–146)
SP GR UR: 1.01 (ref 1–1.03)
TRIGL SERPL-MCNC: 251 MG/DL (ref 0–150)
TSH SERPL DL<=0.05 MIU/L-ACNC: 1.7 MIU/ML (ref 0.35–5.35)
UROBILINOGEN UR QL: NORMAL
WBC NRBC COR # BLD: 10.66 10*3/MM3 (ref 3.5–10.8)

## 2018-12-11 PROCEDURE — 99214 OFFICE O/P EST MOD 30 MIN: CPT | Performed by: NURSE PRACTITIONER

## 2018-12-11 PROCEDURE — 81003 URINALYSIS AUTO W/O SCOPE: CPT | Performed by: NURSE PRACTITIONER

## 2018-12-11 PROCEDURE — 83036 HEMOGLOBIN GLYCOSYLATED A1C: CPT | Performed by: NURSE PRACTITIONER

## 2018-12-11 PROCEDURE — 82044 UR ALBUMIN SEMIQUANTITATIVE: CPT | Performed by: NURSE PRACTITIONER

## 2018-12-11 PROCEDURE — 87086 URINE CULTURE/COLONY COUNT: CPT | Performed by: NURSE PRACTITIONER

## 2018-12-11 PROCEDURE — 84443 ASSAY THYROID STIM HORMONE: CPT | Performed by: NURSE PRACTITIONER

## 2018-12-11 PROCEDURE — 85025 COMPLETE CBC W/AUTO DIFF WBC: CPT | Performed by: NURSE PRACTITIONER

## 2018-12-11 PROCEDURE — 80061 LIPID PANEL: CPT | Performed by: NURSE PRACTITIONER

## 2018-12-11 PROCEDURE — 80053 COMPREHEN METABOLIC PANEL: CPT | Performed by: NURSE PRACTITIONER

## 2018-12-11 PROCEDURE — 36415 COLL VENOUS BLD VENIPUNCTURE: CPT | Performed by: NURSE PRACTITIONER

## 2018-12-11 RX ORDER — AMLODIPINE BESYLATE 5 MG/1
5 TABLET ORAL DAILY
Qty: 30 TABLET | Refills: 5 | Status: SHIPPED | OUTPATIENT
Start: 2018-12-11 | End: 2019-08-27 | Stop reason: SDUPTHER

## 2018-12-11 RX ORDER — CHLORTHALIDONE 25 MG/1
25 TABLET ORAL DAILY
Qty: 30 TABLET | Refills: 5 | Status: SHIPPED | OUTPATIENT
Start: 2018-12-11 | End: 2019-08-28 | Stop reason: SDUPTHER

## 2018-12-11 NOTE — PROGRESS NOTES
Subjective   Isa Vogel is a 53 y.o. female.     History of Present Illness Ms Vogel is here to follow up on her chronic medical problems.  She has htn, dm, GERD, obesity, copd, heart and kidney disease.  She does not follow up as directed with her PCP or specialists due to $60-70 co-pay. She is hoping to chose better insurance next year so she can afford to come to her appointments. She has been non-compliant with diet and exercise. Her weight is stable. States she does not check her blood sugars as directed but last was 180 2 hr PP. Recently complaining of recurrent vaginal infections and dry mouth which she attributes to the jardiance. She would like to change meds.  Does not check her bp at home. Denies chest pain, sob, edema. Denies headaches. She is taking arb-thiazide combo that has been recalled and needs a new med.    The following portions of the patient's history were reviewed and updated as appropriate: allergies, current medications, past family history, past medical history, past social history, past surgical history and problem list.    Review of Systems   Constitutional: Negative for appetite change, fever, unexpected weight gain and unexpected weight loss.   HENT: Negative for congestion, nosebleeds, sore throat and trouble swallowing.    Eyes: Negative for visual disturbance.   Respiratory: Negative for cough, shortness of breath and wheezing.    Cardiovascular: Negative for chest pain, palpitations and leg swelling.   Gastrointestinal: Negative for abdominal pain, blood in stool, constipation, diarrhea, nausea and vomiting.   Endocrine: Positive for polydipsia. Negative for polyphagia and polyuria.   Genitourinary: Positive for vaginal discharge. Negative for dysuria, frequency and hematuria.   Musculoskeletal: Negative for arthralgias, joint swelling and myalgias.   Skin: Negative for rash.   Neurological: Negative for dizziness, seizures, syncope and numbness.   Hematological: Negative for  adenopathy. Does not bruise/bleed easily.   Psychiatric/Behavioral: Negative for behavioral problems, sleep disturbance and depressed mood. The patient is not nervous/anxious.        Objective   Physical Exam   Constitutional: She is oriented to person, place, and time. She appears well-developed and well-nourished. No distress.   HENT:   Head: Normocephalic and atraumatic.   Right Ear: Tympanic membrane and external ear normal.   Left Ear: Tympanic membrane and external ear normal.   Nose: Nose normal.   Mouth/Throat: Oropharynx is clear and moist. No oropharyngeal exudate.   Eyes: Conjunctivae are normal. Pupils are equal, round, and reactive to light. Right eye exhibits no discharge. Left eye exhibits no discharge. No scleral icterus.   Neck: Neck supple. No tracheal deviation present. No thyromegaly present.   Cardiovascular: Normal rate, regular rhythm and normal heart sounds. Exam reveals no gallop and no friction rub.   No murmur heard.  Pulmonary/Chest: Effort normal and breath sounds normal. No respiratory distress. She has no wheezes.   Abdominal: Soft. Bowel sounds are normal. She exhibits no distension and no mass. There is no tenderness.   Musculoskeletal: She exhibits no edema or deformity.   Lymphadenopathy:     She has no cervical adenopathy.   Neurological: She is alert and oriented to person, place, and time. Coordination normal.   Skin: Skin is warm and dry. Capillary refill takes less than 2 seconds. No rash noted. No erythema.   Psychiatric: She has a normal mood and affect. Her speech is normal and behavior is normal. Judgment and thought content normal.   Nursing note and vitals reviewed.        Assessment/Plan   Isa was seen today for medication problem.    Diagnoses and all orders for this visit:    Essential hypertension  -     amLODIPine (NORVASC) 5 MG tablet; Take 1 tablet by mouth Daily.  -     chlorthalidone (HYGROTON) 25 MG tablet; Take 1 tablet by mouth Daily.    Hyperlipidemia LDL  goal <70    Heart disease    Simple chronic bronchitis (CMS/HCC)    Type 2 diabetes mellitus without complication, without long-term current use of insulin (CMS/HCC)  -     POC Glycosylated Hemoglobin (Hb A1C)  -     CBC & Differential  -     Comprehensive Metabolic Panel  -     Lipid Panel  -     TSH  -     POC Microalbumin  -     SITagliptin (JANUVIA) 100 MG tablet; Take 1 tablet by mouth Daily.  -     CBC Auto Differential    Yeast infection  -     miconazole (MICOTIN) 100 MG vaginal suppository; Insert 1 suppository into the vagina Every Night.  -     Urine Culture - Urine, Urine, Clean Catch  -     POC Urinalysis Dipstick, Automated    Switch meds as above. Check labs.    Discussed the importance of low carb diet, annual dilated eye exam, nightly foot checks, annual dentist visit, daily exercise. Monitor blood sugar at least twice a week. Maintain BMI under 25. Have regular follow up appointments for labs and screenings.  Will call with lab results.  Discussed the nature of the disease including, risks, complications, implications, management, safe and proper use of medications. Encouraged therapeutic lifestyle changes including low calorie diet with plenty of fruits and vegetables, daily exercise, medication compliance, and keeping scheduled follow up appointments with me and any other providers. Encouraged patient to have appointment for complete physical, fasting labs, appropriate screenings, and immunizations on an annual basis.  Declines immunizations today.  She need to return for cpx and diabetic foot exam.

## 2018-12-12 DIAGNOSIS — IMO0002 UNCONTROLLED TYPE 2 DIABETES MELLITUS WITH DIABETIC NEUROPATHY, WITHOUT LONG-TERM CURRENT USE OF INSULIN: Primary | ICD-10-CM

## 2018-12-13 LAB — BACTERIA SPEC AEROBE CULT: NORMAL

## 2019-01-03 ENCOUNTER — TELEPHONE (OUTPATIENT)
Dept: FAMILY MEDICINE CLINIC | Facility: CLINIC | Age: 54
End: 2019-01-03

## 2019-01-03 DIAGNOSIS — IMO0002 UNCONTROLLED TYPE 2 DIABETES MELLITUS WITH DIABETIC NEUROPATHY, WITHOUT LONG-TERM CURRENT USE OF INSULIN: ICD-10-CM

## 2019-01-03 NOTE — TELEPHONE ENCOUNTER
Patient's insurance denied Trulicity. She is Metformin and Januvia and fasting glucose is still over 200. No further urinary symptoms off of jardiance. Will try to PA Trulicity.

## 2019-01-04 DIAGNOSIS — E78.5 HYPERLIPIDEMIA LDL GOAL <70: ICD-10-CM

## 2019-01-07 RX ORDER — SIMVASTATIN 20 MG
TABLET ORAL
Qty: 90 TABLET | Refills: 0 | Status: SHIPPED | OUTPATIENT
Start: 2019-01-07 | End: 2019-05-28 | Stop reason: SDUPTHER

## 2019-01-18 ENCOUNTER — TELEPHONE (OUTPATIENT)
Dept: FAMILY MEDICINE CLINIC | Facility: CLINIC | Age: 54
End: 2019-01-18

## 2019-01-23 ENCOUNTER — OFFICE VISIT (OUTPATIENT)
Dept: FAMILY MEDICINE CLINIC | Facility: CLINIC | Age: 54
End: 2019-01-23

## 2019-01-23 VITALS
WEIGHT: 293 LBS | HEART RATE: 68 BPM | HEIGHT: 68 IN | TEMPERATURE: 98.2 F | DIASTOLIC BLOOD PRESSURE: 86 MMHG | BODY MASS INDEX: 44.41 KG/M2 | SYSTOLIC BLOOD PRESSURE: 118 MMHG | OXYGEN SATURATION: 96 %

## 2019-01-23 DIAGNOSIS — R11.0 NAUSEA: ICD-10-CM

## 2019-01-23 DIAGNOSIS — H65.93 BILATERAL OTITIS MEDIA WITH EFFUSION: ICD-10-CM

## 2019-01-23 DIAGNOSIS — R19.7 DIARRHEA, UNSPECIFIED TYPE: ICD-10-CM

## 2019-01-23 DIAGNOSIS — R50.9 FEVER, UNSPECIFIED FEVER CAUSE: Primary | ICD-10-CM

## 2019-01-23 LAB
EXPIRATION DATE: NORMAL
FLUAV AG NPH QL: NEGATIVE
FLUBV AG NPH QL: NEGATIVE
INTERNAL CONTROL: NORMAL
Lab: NORMAL

## 2019-01-23 PROCEDURE — 87804 INFLUENZA ASSAY W/OPTIC: CPT | Performed by: FAMILY MEDICINE

## 2019-01-23 PROCEDURE — 99213 OFFICE O/P EST LOW 20 MIN: CPT | Performed by: FAMILY MEDICINE

## 2019-01-23 RX ORDER — AMOXICILLIN AND CLAVULANATE POTASSIUM 875; 125 MG/1; MG/1
1 TABLET, FILM COATED ORAL 2 TIMES DAILY
Qty: 20 TABLET | Refills: 0 | Status: SHIPPED | OUTPATIENT
Start: 2019-01-23 | End: 2019-03-11

## 2019-01-23 RX ORDER — ONDANSETRON 4 MG/1
4 TABLET, FILM COATED ORAL EVERY 8 HOURS PRN
Qty: 12 TABLET | Refills: 1 | Status: SHIPPED | OUTPATIENT
Start: 2019-01-23

## 2019-01-23 NOTE — PROGRESS NOTES
"Subjective   Isa Vogel is a 53 y.o. female.     Pt is here due to congestion, body aches, fatigue, diarrhea and vomiting, started 2 days ago with feeling bad.  Someone in home diagnosed last week with flu.    GI Problem   The primary symptoms include fever, fatigue, nausea, diarrhea and myalgias. Primary symptoms do not include weight loss, abdominal pain, vomiting, melena, hematemesis, jaundice, hematochezia, dysuria, arthralgias or rash. The illness began 2 days ago. The onset was gradual. The problem has not changed since onset.  The illness is also significant for chills. The illness does not include anorexia, dysphagia, odynophagia, bloating, constipation, tenesmus, back pain or itching. Associated medical issues do not include inflammatory bowel disease, GERD, gallstones, liver disease, alcohol abuse or hemorrhoids.      Granddaughter with flu last week.    No recent travel.     Also reports headache sinus pressure bilateral earache postnasal drainage sore throat.  No rash    The following portions of the patient's history were reviewed and updated as appropriate: allergies, current medications, past family history, past medical history, past social history, past surgical history and problem list.    Review of Systems   Constitutional: Positive for chills, fatigue and fever. Negative for weight loss.   Gastrointestinal: Positive for diarrhea and nausea. Negative for abdominal pain, anorexia, bloating, constipation, dysphagia, hematemesis, hematochezia, jaundice, melena and vomiting.   Genitourinary: Negative for dysuria.   Musculoskeletal: Positive for myalgias. Negative for arthralgias and back pain.   Skin: Negative for itching and rash.       Objective     Vitals:    01/23/19 1058   BP: 118/86   Pulse: 68   Temp: 98.2 °F (36.8 °C)   SpO2: 96%   Weight: 134 kg (295 lb 9.6 oz)   Height: 172.7 cm (68\")       Physical Exam   Constitutional: She is oriented to person, place, and time. She appears " well-developed and well-nourished.   HENT:   Head: Normocephalic and atraumatic.   Nose: Nose normal.   Mouth/Throat: Oropharynx is clear and moist. No oropharyngeal exudate.   Bilateral tympanic membranes are dull injected with fluid   Eyes: EOM are normal. Pupils are equal, round, and reactive to light. Right eye exhibits no discharge. Left eye exhibits no discharge.   Neck: Normal range of motion. Neck supple.   Cardiovascular: Normal rate, regular rhythm, normal heart sounds and intact distal pulses.   Pulmonary/Chest: Effort normal and breath sounds normal.   Abdominal: Soft. Bowel sounds are normal. She exhibits no distension and no mass. There is tenderness. There is no rebound and no guarding. No hernia.   She has mild tenderness in the right middle quadrant of her abdomen and left lower quadrant.  No rebound or guarding no hepatosplenomegaly no obvious palpable masses exam limited due to body habitus   Musculoskeletal: Normal range of motion.        Right shoulder: She exhibits no swelling.   Neurological: She is alert and oriented to person, place, and time. She has normal reflexes.   Skin: Skin is warm and dry. No cyanosis. Nails show no clubbing.   Psychiatric: She has a normal mood and affect. Her behavior is normal. Judgment and thought content normal.   Nursing note and vitals reviewed.      Assessment/Plan     Problem List Items Addressed This Visit        Digestive    Diarrhea       Nervous and Auditory    Bilateral otitis media with effusion    Relevant Medications    amoxicillin-clavulanate (AUGMENTIN) 875-125 MG per tablet       Other    Fever - Primary    Relevant Orders    POC Influenza A / B (Completed)      Other Visit Diagnoses     Nausea        Relevant Medications    ondansetron (ZOFRAN) 4 MG tablet        Up if symptoms have not improved.  She was rapid flu negative today.  Zofran as needed for nausea

## 2019-02-17 DIAGNOSIS — F32.9 REACTIVE DEPRESSION: ICD-10-CM

## 2019-02-19 RX ORDER — CITALOPRAM 20 MG/1
TABLET ORAL
Qty: 30 TABLET | Refills: 0 | Status: SHIPPED | OUTPATIENT
Start: 2019-02-19 | End: 2019-03-11

## 2019-02-19 RX ORDER — METFORMIN HYDROCHLORIDE EXTENDED-RELEASE TABLETS 1000 MG/1
TABLET, FILM COATED, EXTENDED RELEASE ORAL
Qty: 30 TABLET | Refills: 0 | Status: SHIPPED | OUTPATIENT
Start: 2019-02-19 | End: 2019-03-23 | Stop reason: SDUPTHER

## 2019-02-23 DIAGNOSIS — R10.84 GENERALIZED ABDOMINAL PAIN: ICD-10-CM

## 2019-02-25 RX ORDER — PANTOPRAZOLE SODIUM 40 MG/1
TABLET, DELAYED RELEASE ORAL
Qty: 90 TABLET | Refills: 0 | Status: SHIPPED | OUTPATIENT
Start: 2019-02-25 | End: 2019-05-23 | Stop reason: SDUPTHER

## 2019-03-11 ENCOUNTER — OFFICE VISIT (OUTPATIENT)
Dept: FAMILY MEDICINE CLINIC | Facility: CLINIC | Age: 54
End: 2019-03-11

## 2019-03-11 VITALS
SYSTOLIC BLOOD PRESSURE: 124 MMHG | BODY MASS INDEX: 44.41 KG/M2 | HEIGHT: 68 IN | DIASTOLIC BLOOD PRESSURE: 88 MMHG | HEART RATE: 66 BPM | TEMPERATURE: 99 F | OXYGEN SATURATION: 96 % | WEIGHT: 293 LBS

## 2019-03-11 DIAGNOSIS — F32.9 REACTIVE DEPRESSION: ICD-10-CM

## 2019-03-11 DIAGNOSIS — F41.9 ANXIETY: ICD-10-CM

## 2019-03-11 DIAGNOSIS — E11.9 TYPE 2 DIABETES MELLITUS WITHOUT COMPLICATION, WITHOUT LONG-TERM CURRENT USE OF INSULIN (HCC): Primary | ICD-10-CM

## 2019-03-11 DIAGNOSIS — IMO0002 UNCONTROLLED TYPE 2 DIABETES MELLITUS WITH DIABETIC NEUROPATHY, WITHOUT LONG-TERM CURRENT USE OF INSULIN: ICD-10-CM

## 2019-03-11 DIAGNOSIS — Z12.39 BREAST CANCER SCREENING: ICD-10-CM

## 2019-03-11 LAB
EXPIRATION DATE: NORMAL
HBA1C MFR BLD: 8.3 %
Lab: NORMAL

## 2019-03-11 PROCEDURE — 99214 OFFICE O/P EST MOD 30 MIN: CPT | Performed by: FAMILY MEDICINE

## 2019-03-11 PROCEDURE — 83036 HEMOGLOBIN GLYCOSYLATED A1C: CPT | Performed by: FAMILY MEDICINE

## 2019-03-11 RX ORDER — CITALOPRAM 40 MG/1
40 TABLET ORAL DAILY
Qty: 90 TABLET | Refills: 3 | Status: SHIPPED | OUTPATIENT
Start: 2019-03-11 | End: 2020-06-04

## 2019-03-11 RX ORDER — GUANFACINE 1 MG/1
1 TABLET ORAL DAILY PRN
Qty: 45 TABLET | Refills: 3 | Status: SHIPPED | OUTPATIENT
Start: 2019-03-11

## 2019-03-25 RX ORDER — METFORMIN HYDROCHLORIDE EXTENDED-RELEASE TABLETS 1000 MG/1
TABLET, FILM COATED, EXTENDED RELEASE ORAL
Qty: 30 TABLET | Refills: 0 | Status: SHIPPED | OUTPATIENT
Start: 2019-03-25 | End: 2019-06-16 | Stop reason: SDUPTHER

## 2019-03-26 ENCOUNTER — PRIOR AUTHORIZATION (OUTPATIENT)
Dept: FAMILY MEDICINE CLINIC | Facility: CLINIC | Age: 54
End: 2019-03-26

## 2019-03-28 ENCOUNTER — TELEPHONE (OUTPATIENT)
Dept: FAMILY MEDICINE CLINIC | Facility: CLINIC | Age: 54
End: 2019-03-28

## 2019-03-28 NOTE — TELEPHONE ENCOUNTER
----- Message from Roberta Julia sent at 3/28/2019  8:15 AM EDT -----  Regarding: DR HASKINS PT HAS CONCERNS  Contact: [phone]  Patient called and said she is having problems with her      Dulaglutide .75mg, she said Dr Haskins increased her dosage and now she is having diarrhea 20-30 times a day.  Please call and advise  @ [phone].  Thank you.

## 2019-05-01 ENCOUNTER — HOSPITAL ENCOUNTER (OUTPATIENT)
Dept: MAMMOGRAPHY | Facility: HOSPITAL | Age: 54
Discharge: HOME | End: 2019-05-01
Admitting: FAMILY MEDICINE

## 2019-05-01 DIAGNOSIS — Z12.39 BREAST CANCER SCREENING: ICD-10-CM

## 2019-05-01 PROCEDURE — 77067 SCR MAMMO BI INCL CAD: CPT

## 2019-05-01 PROCEDURE — 77067 SCR MAMMO BI INCL CAD: CPT | Performed by: RADIOLOGY

## 2019-05-01 PROCEDURE — 77063 BREAST TOMOSYNTHESIS BI: CPT

## 2019-05-01 PROCEDURE — 77063 BREAST TOMOSYNTHESIS BI: CPT | Performed by: RADIOLOGY

## 2019-05-23 DIAGNOSIS — R10.84 GENERALIZED ABDOMINAL PAIN: ICD-10-CM

## 2019-05-28 DIAGNOSIS — E78.5 HYPERLIPIDEMIA LDL GOAL <70: ICD-10-CM

## 2019-05-29 RX ORDER — SIMVASTATIN 20 MG
TABLET ORAL
Qty: 90 TABLET | Refills: 0 | Status: SHIPPED | OUTPATIENT
Start: 2019-05-29 | End: 2019-09-24 | Stop reason: SDUPTHER

## 2019-05-31 ENCOUNTER — HOSPITAL ENCOUNTER (OUTPATIENT)
Dept: MAMMOGRAPHY | Facility: HOSPITAL | Age: 54
Discharge: HOME | End: 2019-05-31

## 2019-05-31 DIAGNOSIS — Z12.31 VISIT FOR SCREENING MAMMOGRAM: ICD-10-CM

## 2019-06-03 RX ORDER — PANTOPRAZOLE SODIUM 40 MG/1
TABLET, DELAYED RELEASE ORAL
Qty: 90 TABLET | Refills: 0 | Status: SHIPPED | OUTPATIENT
Start: 2019-06-03 | End: 2019-12-01 | Stop reason: SDUPTHER

## 2019-06-11 ENCOUNTER — OFFICE VISIT (OUTPATIENT)
Dept: FAMILY MEDICINE CLINIC | Facility: CLINIC | Age: 54
End: 2019-06-11

## 2019-06-11 VITALS
SYSTOLIC BLOOD PRESSURE: 122 MMHG | BODY MASS INDEX: 44.41 KG/M2 | WEIGHT: 293 LBS | HEIGHT: 68 IN | HEART RATE: 66 BPM | DIASTOLIC BLOOD PRESSURE: 86 MMHG | OXYGEN SATURATION: 98 %

## 2019-06-11 DIAGNOSIS — F41.9 ANXIETY: ICD-10-CM

## 2019-06-11 DIAGNOSIS — E11.9 TYPE 2 DIABETES MELLITUS WITHOUT COMPLICATION, WITHOUT LONG-TERM CURRENT USE OF INSULIN (HCC): Primary | ICD-10-CM

## 2019-06-11 LAB
EXPIRATION DATE: NORMAL
HBA1C MFR BLD: 10 %
Lab: NORMAL

## 2019-06-11 PROCEDURE — 99214 OFFICE O/P EST MOD 30 MIN: CPT | Performed by: FAMILY MEDICINE

## 2019-06-11 PROCEDURE — 83036 HEMOGLOBIN GLYCOSYLATED A1C: CPT | Performed by: FAMILY MEDICINE

## 2019-06-11 RX ORDER — PEN NEEDLE, DIABETIC 30 GX5/16"
1 NEEDLE, DISPOSABLE MISCELLANEOUS 2 TIMES DAILY
Qty: 100 EACH | Refills: 0 | Status: SHIPPED | OUTPATIENT
Start: 2019-06-11 | End: 2019-08-17 | Stop reason: SDUPTHER

## 2019-06-11 RX ORDER — SYRING-NEEDL,DISP,INSUL,0.3 ML 30 GX5/16"
1 SYRINGE, EMPTY DISPOSABLE MISCELLANEOUS 2 TIMES DAILY
Qty: 100 EACH | Refills: 0 | Status: SHIPPED | OUTPATIENT
Start: 2019-06-11

## 2019-06-11 NOTE — PROGRESS NOTES
"Subjective   Isa Vogel is a 53 y.o. female.     Pt is here to fu on anxiety.  Increase in citalopram and adding tenex is helping anxiety.    Pt has not been check home blood glucose readings.    Diabetes   She presents for her follow-up diabetic visit. She has type 2 diabetes mellitus. Her disease course has been worsening. There are no hypoglycemic associated symptoms. Associated symptoms include foot paresthesias. There are no hypoglycemic complications. Symptoms are worsening. Diabetic complications include nephropathy. Risk factors for coronary artery disease include diabetes mellitus, dyslipidemia, hypertension and obesity. Current diabetic treatment includes oral agent (triple therapy). She is compliant with treatment some of the time. Her weight is stable. She is following a generally unhealthy diet. When asked about meal planning, she reported none. She has not had a previous visit with a dietitian. She rarely participates in exercise. Home blood sugar record trend: not checking. An ACE inhibitor/angiotensin II receptor blocker is contraindicated. She sees a podiatrist.       The following portions of the patient's history were reviewed and updated as appropriate: allergies, current medications, past family history, past medical history, past social history, past surgical history and problem list.    Review of Systems   Constitutional: Negative.    HENT: Negative.    Eyes: Negative.    Respiratory: Negative.    Cardiovascular: Negative.    Gastrointestinal: Negative.    Endocrine: Negative.    Genitourinary: Negative.    Musculoskeletal: Negative.    Skin: Negative.    Allergic/Immunologic: Negative.    Neurological: Positive for numbness.   Hematological: Negative.    Psychiatric/Behavioral: Negative.    All other systems reviewed and are negative.      Objective     Vitals:    06/11/19 1557   BP: 122/86   Pulse: 66   SpO2: 98%   Weight: 135 kg (298 lb 9.6 oz)   Height: 172.7 cm (68\")       Physical " Exam   Constitutional: She is oriented to person, place, and time. She appears well-developed and well-nourished.   HENT:   Head: Normocephalic and atraumatic.   Eyes: EOM are normal. Pupils are equal, round, and reactive to light. Right eye exhibits no discharge. Left eye exhibits no discharge.   Neck: Normal range of motion. Neck supple.   Cardiovascular: Normal rate, regular rhythm, normal heart sounds and intact distal pulses.   Pulmonary/Chest: Effort normal and breath sounds normal.   Abdominal: Soft. Bowel sounds are normal. She exhibits no mass. There is no tenderness.   Musculoskeletal: Normal range of motion.        Right shoulder: She exhibits no swelling.   Neurological: She is alert and oriented to person, place, and time. She has normal reflexes.   Skin: Skin is warm and dry. No cyanosis. Nails show no clubbing.   Psychiatric: She has a normal mood and affect. Her behavior is normal. Judgment and thought content normal.   Nursing note and vitals reviewed.      Assessment/Plan     Problem List Items Addressed This Visit        Endocrine    Diabetes mellitus (CMS/[facility]) - Primary    Relevant Medications    Insulin Glargine (LANTUS SOLOSTAR) 100 UNIT/ML injection pen    Other Relevant Orders    POC Glycosylated Hemoglobin (Hb A1C) (Completed)    Ambulatory Referral to Podiatry    Ambulatory Referral to Endocrinology       Other    Anxiety        poct A1c 10 today.   Start lantus 15 units night  Side effects from trulicity.    Refer endo.   Keep glucose log for next 2-3 weeks.  Call office and will adjust lantus dose based on fsbg.    Fu 3 mo.

## 2019-06-17 RX ORDER — METFORMIN HYDROCHLORIDE EXTENDED-RELEASE TABLETS 1000 MG/1
TABLET, FILM COATED, EXTENDED RELEASE ORAL
Qty: 30 TABLET | Refills: 0 | Status: SHIPPED | OUTPATIENT
Start: 2019-06-17 | End: 2019-07-14 | Stop reason: SDUPTHER

## 2019-07-15 RX ORDER — METFORMIN HYDROCHLORIDE EXTENDED-RELEASE TABLETS 1000 MG/1
TABLET, FILM COATED, EXTENDED RELEASE ORAL
Qty: 30 TABLET | Refills: 0 | Status: SHIPPED | OUTPATIENT
Start: 2019-07-15 | End: 2019-08-17 | Stop reason: SDUPTHER

## 2019-08-19 RX ORDER — METFORMIN HYDROCHLORIDE EXTENDED-RELEASE TABLETS 1000 MG/1
TABLET, FILM COATED, EXTENDED RELEASE ORAL
Qty: 30 TABLET | Refills: 0 | Status: SHIPPED | OUTPATIENT
Start: 2019-08-19 | End: 2019-09-15 | Stop reason: SDUPTHER

## 2019-08-19 RX ORDER — FLURBIPROFEN SODIUM 0.3 MG/ML
SOLUTION/ DROPS OPHTHALMIC
Qty: 100 EACH | Refills: 0 | Status: SHIPPED | OUTPATIENT
Start: 2019-08-19

## 2019-08-27 DIAGNOSIS — I10 ESSENTIAL HYPERTENSION: ICD-10-CM

## 2019-08-27 RX ORDER — AMLODIPINE BESYLATE 5 MG/1
5 TABLET ORAL DAILY
Qty: 30 TABLET | Refills: 0 | Status: SHIPPED | OUTPATIENT
Start: 2019-08-27 | End: 2019-09-15 | Stop reason: SDUPTHER

## 2019-08-28 ENCOUNTER — OFFICE VISIT (OUTPATIENT)
Dept: FAMILY MEDICINE CLINIC | Facility: CLINIC | Age: 54
End: 2019-08-28

## 2019-08-28 VITALS
OXYGEN SATURATION: 98 % | TEMPERATURE: 98.2 F | WEIGHT: 292 LBS | HEIGHT: 68 IN | BODY MASS INDEX: 44.25 KG/M2 | HEART RATE: 57 BPM | DIASTOLIC BLOOD PRESSURE: 66 MMHG | SYSTOLIC BLOOD PRESSURE: 108 MMHG

## 2019-08-28 DIAGNOSIS — E78.5 HYPERLIPIDEMIA LDL GOAL <70: ICD-10-CM

## 2019-08-28 DIAGNOSIS — H91.92 HEARING LOSS OF LEFT EAR, UNSPECIFIED HEARING LOSS TYPE: ICD-10-CM

## 2019-08-28 DIAGNOSIS — I10 ESSENTIAL HYPERTENSION: ICD-10-CM

## 2019-08-28 DIAGNOSIS — E11.9 TYPE 2 DIABETES MELLITUS WITHOUT COMPLICATION, WITHOUT LONG-TERM CURRENT USE OF INSULIN (HCC): ICD-10-CM

## 2019-08-28 DIAGNOSIS — IMO0002 UNCONTROLLED TYPE 2 DIABETES MELLITUS WITH DIABETIC NEUROPATHY, WITHOUT LONG-TERM CURRENT USE OF INSULIN: Primary | ICD-10-CM

## 2019-08-28 LAB
EXPIRATION DATE: NORMAL
HBA1C MFR BLD: 8.9 %
Lab: NORMAL

## 2019-08-28 PROCEDURE — 83036 HEMOGLOBIN GLYCOSYLATED A1C: CPT | Performed by: FAMILY MEDICINE

## 2019-08-28 PROCEDURE — 99214 OFFICE O/P EST MOD 30 MIN: CPT | Performed by: FAMILY MEDICINE

## 2019-08-28 RX ORDER — CHLORTHALIDONE 25 MG/1
25 TABLET ORAL DAILY
Qty: 90 TABLET | Refills: 3 | Status: SHIPPED | OUTPATIENT
Start: 2019-08-28 | End: 2020-08-31

## 2019-08-28 RX ORDER — GABAPENTIN 100 MG/1
CAPSULE ORAL
Refills: 2 | COMMUNITY
Start: 2019-08-21

## 2019-08-28 NOTE — PROGRESS NOTES
Subjective   Isa Vogel is a 53 y.o. female.     Pt is here to fu on a1c. Pt is aware she is 2 weeks early and insurance may not pay. Pt would like to go ahead and get a1c checked today.  Home fasting fsbg has been up to 158 for last 2 weeks.    Diabetes   She presents for her follow-up diabetic visit. She has type 2 diabetes mellitus. Her disease course has been improving. There are no hypoglycemic associated symptoms. Associated symptoms include foot paresthesias. There are no hypoglycemic complications. Symptoms are stable. Diabetic complications include peripheral neuropathy. Risk factors for coronary artery disease include dyslipidemia, diabetes mellitus, hypertension, obesity and sedentary lifestyle. Current diabetic treatment includes insulin injections and oral agent (triple therapy). She is compliant with treatment most of the time. She is following a generally healthy diet. Meal planning includes avoidance of concentrated sweets. She has had a previous visit with a dietitian. She participates in exercise intermittently. Her home blood glucose trend is decreasing steadily. Her breakfast blood glucose is taken between 8-9 am. Her breakfast blood glucose range is generally 140-180 mg/dl. An ACE inhibitor/angiotensin II receptor blocker is being taken. She sees a podiatrist.Eye exam is current.    last a1c 10.  fsbg 150 in last 3 weeks.  She is taking 15 units of lantus at night.      She was drinking 4 sodas a day.  She has stopped in last 3 weeks.      Decrease hearing in left ear.  Ear infection years ago.  She is having some ear loss.  Dry skin in ear she is requesting to see ENT.      The following portions of the patient's history were reviewed and updated as appropriate: allergies, current medications, past family history, past medical history, past social history, past surgical history and problem list.    Review of Systems   Constitutional: Negative.    HENT: Negative.    Eyes: Negative.   "  Respiratory: Negative.    Cardiovascular: Negative.    Gastrointestinal: Negative.    Endocrine: Negative.    Genitourinary: Negative.    Musculoskeletal: Negative.    Skin: Negative.    Allergic/Immunologic: Negative.    Neurological: Negative.    Hematological: Negative.    Psychiatric/Behavioral: Negative.    All other systems reviewed and are negative.      Objective     Vitals:    08/28/19 1459   BP: 108/66   Pulse: 57   Temp: 98.2 °F (36.8 °C)   SpO2: 98%   Weight: 132 kg (292 lb)   Height: 172.7 cm (68\")       Physical Exam   Constitutional: She appears well-developed and well-nourished.   HENT:   Head: Normocephalic and atraumatic.   Right Ear: External ear normal.   Left Ear: External ear normal.   Nose: Nose normal.   Mouth/Throat: Oropharynx is clear and moist. No oropharyngeal exudate.   Neck: Normal range of motion. Neck supple. No thyromegaly present.   Cardiovascular: Normal rate, regular rhythm and normal heart sounds. Exam reveals no friction rub.   No murmur heard.  Pulmonary/Chest: Effort normal and breath sounds normal. No stridor. No respiratory distress. She has no wheezes.   Psychiatric: She has a normal mood and affect. Her behavior is normal.   Nursing note and vitals reviewed.      Assessment/Plan     Problem List Items Addressed This Visit        Cardiovascular and Mediastinum    Essential hypertension    Relevant Medications    chlorthalidone (HYGROTON) 25 MG tablet    Hyperlipidemia LDL goal <70       Endocrine    Diabetes mellitus (CMS/[facility])    Relevant Medications    Insulin Glargine (LANTUS SOLOSTAR) 100 UNIT/ML injection pen    Uncontrolled type 2 diabetes mellitus with diabetic neuropathy, without long-term current use of insulin (CMS/[facility]) - Primary    Relevant Medications    Insulin Glargine (LANTUS SOLOSTAR) 100 UNIT/ML injection pen    Other Relevant Orders    POC Glycosylated Hemoglobin (Hb A1C) (Completed)      Other Visit Diagnoses     Hearing loss of left ear, unspecified " hearing loss type        Relevant Orders    Ambulatory Referral to ENT (Otolaryngology)        poct a1c 8.9 today.

## 2019-09-03 DIAGNOSIS — E11.9 TYPE 2 DIABETES MELLITUS WITHOUT COMPLICATION, WITHOUT LONG-TERM CURRENT USE OF INSULIN (HCC): ICD-10-CM

## 2019-09-03 RX ORDER — SITAGLIPTIN 100 MG/1
TABLET, FILM COATED ORAL
Qty: 30 TABLET | Refills: 0 | Status: SHIPPED | OUTPATIENT
Start: 2019-09-03 | End: 2019-09-15 | Stop reason: SDUPTHER

## 2019-09-15 DIAGNOSIS — I10 ESSENTIAL HYPERTENSION: ICD-10-CM

## 2019-09-15 DIAGNOSIS — E11.9 TYPE 2 DIABETES MELLITUS WITHOUT COMPLICATION, WITHOUT LONG-TERM CURRENT USE OF INSULIN (HCC): ICD-10-CM

## 2019-09-15 RX ORDER — METFORMIN HYDROCHLORIDE EXTENDED-RELEASE TABLETS 1000 MG/1
TABLET, FILM COATED, EXTENDED RELEASE ORAL
Qty: 30 TABLET | Refills: 0 | Status: SHIPPED | OUTPATIENT
Start: 2019-09-15 | End: 2019-09-30 | Stop reason: ALTCHOICE

## 2019-09-15 RX ORDER — SITAGLIPTIN 100 MG/1
TABLET, FILM COATED ORAL
Qty: 30 TABLET | Refills: 0 | Status: SHIPPED | OUTPATIENT
Start: 2019-09-15 | End: 2019-09-30 | Stop reason: SDUPTHER

## 2019-09-15 RX ORDER — AMLODIPINE BESYLATE 5 MG/1
5 TABLET ORAL DAILY
Qty: 30 TABLET | Refills: 0 | Status: SHIPPED | OUTPATIENT
Start: 2019-09-15 | End: 2019-10-15 | Stop reason: SDUPTHER

## 2019-09-24 DIAGNOSIS — E78.5 HYPERLIPIDEMIA LDL GOAL <70: ICD-10-CM

## 2019-09-25 RX ORDER — SIMVASTATIN 20 MG
TABLET ORAL
Qty: 90 TABLET | Refills: 0 | Status: SHIPPED | OUTPATIENT
Start: 2019-09-25 | End: 2019-10-01 | Stop reason: SDUPTHER

## 2019-09-30 ENCOUNTER — OFFICE VISIT (OUTPATIENT)
Dept: ENDOCRINOLOGY | Facility: CLINIC | Age: 54
End: 2019-09-30

## 2019-09-30 VITALS
WEIGHT: 290 LBS | DIASTOLIC BLOOD PRESSURE: 70 MMHG | HEART RATE: 52 BPM | OXYGEN SATURATION: 99 % | BODY MASS INDEX: 44.09 KG/M2 | SYSTOLIC BLOOD PRESSURE: 108 MMHG

## 2019-09-30 DIAGNOSIS — E11.42 DIABETIC PERIPHERAL NEUROPATHY ASSOCIATED WITH TYPE 2 DIABETES MELLITUS (HCC): ICD-10-CM

## 2019-09-30 DIAGNOSIS — E78.2 MIXED HYPERLIPIDEMIA: ICD-10-CM

## 2019-09-30 DIAGNOSIS — E11.65 UNCONTROLLED TYPE 2 DIABETES MELLITUS WITH HYPERGLYCEMIA (HCC): Primary | ICD-10-CM

## 2019-09-30 LAB
ALBUMIN SERPL-MCNC: 3.9 G/DL (ref 3.5–5.2)
ALBUMIN/GLOB SERPL: 1.3 G/DL
ALP SERPL-CCNC: 51 U/L (ref 39–117)
ALT SERPL W P-5'-P-CCNC: 39 U/L (ref 1–33)
ANION GAP SERPL CALCULATED.3IONS-SCNC: 10 MMOL/L (ref 5–15)
AST SERPL-CCNC: 37 U/L (ref 1–32)
BILIRUB SERPL-MCNC: 0.5 MG/DL (ref 0.2–1.2)
BUN BLD-MCNC: 10 MG/DL (ref 6–20)
BUN/CREAT SERPL: 13.2 (ref 7–25)
CALCIUM SPEC-SCNC: 9.5 MG/DL (ref 8.6–10.5)
CHLORIDE SERPL-SCNC: 100 MMOL/L (ref 98–107)
CHOLEST SERPL-MCNC: 213 MG/DL (ref 0–200)
CO2 SERPL-SCNC: 30 MMOL/L (ref 22–29)
CREAT BLD-MCNC: 0.76 MG/DL (ref 0.57–1)
GFR SERPL CREATININE-BSD FRML MDRD: 80 ML/MIN/1.73
GLOBULIN UR ELPH-MCNC: 3 GM/DL
GLUCOSE BLD-MCNC: 142 MG/DL (ref 65–99)
GLUCOSE BLDC GLUCOMTR-MCNC: 130 MG/DL (ref 70–130)
HBA1C MFR BLD: 8.5 %
HDLC SERPL-MCNC: 41 MG/DL (ref 40–60)
LDLC SERPL CALC-MCNC: 132 MG/DL (ref 0–100)
LDLC/HDLC SERPL: 3.21 {RATIO}
POTASSIUM BLD-SCNC: 4.6 MMOL/L (ref 3.5–5.2)
PROT SERPL-MCNC: 6.9 G/DL (ref 6–8.5)
SODIUM BLD-SCNC: 140 MMOL/L (ref 136–145)
TRIGL SERPL-MCNC: 201 MG/DL (ref 0–150)
VLDLC SERPL-MCNC: 40.2 MG/DL (ref 5–40)

## 2019-09-30 PROCEDURE — 82947 ASSAY GLUCOSE BLOOD QUANT: CPT | Performed by: PHYSICIAN ASSISTANT

## 2019-09-30 PROCEDURE — 99204 OFFICE O/P NEW MOD 45 MIN: CPT | Performed by: PHYSICIAN ASSISTANT

## 2019-09-30 PROCEDURE — 80061 LIPID PANEL: CPT | Performed by: PHYSICIAN ASSISTANT

## 2019-09-30 PROCEDURE — 83036 HEMOGLOBIN GLYCOSYLATED A1C: CPT | Performed by: PHYSICIAN ASSISTANT

## 2019-09-30 PROCEDURE — 80053 COMPREHEN METABOLIC PANEL: CPT | Performed by: PHYSICIAN ASSISTANT

## 2019-09-30 NOTE — PATIENT INSTRUCTIONS
Change metformin to Synjardy extended release 5-1000mg 2 pills daily - let me know if you have issue with yeast infections  Continue Januvia 100mg daily  Increase Lantus to 30 units daily

## 2019-09-30 NOTE — PROGRESS NOTES
"Chief Complaint  Establish care for Diabetes Mellitus.    HPI   Isa Vogel is a 54 y.o. female who is here today for evaluation of Diabetes Mellitus type 2. The initial diagnosis of diabetes was made 2016.    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     A1C- 8.5 (9/30/19), 8.8 (8/28/19), 8.3 (3/11/19), 10.4 (12/11/18)    Diabetic complications: cardiovascular disease- pt reports hx of \"mild heart attack\" that did not require intervention, peripheral neuropathy controlled on gabapentin 100mg BID, rx by [facility]  Eye exam current (within one year): utd  Foot care and dental care: discussed, follows with podiatry    Current diabetic medications include:  Metformin ER 1000mg QD - has not been on higher dose in the past   Januvia 100mg QD  Lantus 20u qhs    Statin: No    Past medications: Trulicity (did not tolerate higher dose, tolerated 0.75mg), jardiance (severe yeast infection with 25mg, tolerated 10mg)    Diabetic Monitoring  -   No meter or log for review. Pt reports -190     Nutrition:     Current diet: in general, an \"unhealthy\" diet, on average, 2 meals per day plus snacks. Stopped drinking sodas.   Current exercise: none    The following portions of the patient's history were reviewed and updated by me as appropriate: allergies, current medications, past family history, past social history, past surgical history and problem list.    Past Medical History:   Diagnosis Date   • Allergic    • Asthma    • Bell's palsy    • COPD (chronic obstructive pulmonary disease) (CMS/[facility])    • Diabetes mellitus (CMS/[facility])    • Diverticulosis    • Essential hypertension 8/3/2016   • GERD (gastroesophageal reflux disease)    • Heart disease    • Kidney disease    • Obesity    • Visual impairment        Medications    Current Outpatient Medications:   •  albuterol (PROVENTIL HFA;VENTOLIN HFA) 108 (90 BASE) MCG/ACT inhaler, Inhale 2 puffs every 4 (four) hours as needed for wheezing., Disp: 1 inhaler, Rfl: " 11  •  albuterol (PROVENTIL) (2.5 MG/3ML) 0.083% nebulizer solution, Take 2.5 mg by nebulization every 4 (four) hours as needed for wheezing., Disp: , Rfl:   •  amLODIPine (NORVASC) 5 MG tablet, TAKE 1 TABLET BY MOUTH DAILY, Disp: 30 tablet, Rfl: 0  •  B-D UF III MINI PEN NEEDLES 31G X 5 MM misc, USE AS DIRECTED TWICE DAILY, Disp: 100 each, Rfl: 0  •  chlorthalidone (HYGROTON) 25 MG tablet, Take 1 tablet by mouth Daily., Disp: 90 tablet, Rfl: 3  •  citalopram (CeleXA) 40 MG tablet, Take 1 tablet by mouth Daily., Disp: 90 tablet, Rfl: 3  •  gabapentin (NEURONTIN) 100 MG capsule, TK ONE C PO BID, Disp: , Rfl: 2  •  guanFACINE (TENEX) 1 MG tablet, Take 1 tablet by mouth Daily As Needed (anxiety)., Disp: 45 tablet, Rfl: 3  •  Insulin Glargine (LANTUS SOLOSTAR) 100 UNIT/ML injection pen, Inject 30 Units under the skin into the appropriate area as directed Every Night., Disp: 9 pen, Rfl: 1  •  Lancet Device misc, 1 each 2 (Two) Times a Day., Disp: 100 each, Rfl: 0  •  ondansetron (ZOFRAN) 4 MG tablet, Take 1 tablet by mouth Every 8 (Eight) Hours As Needed for Nausea or Vomiting., Disp: 12 tablet, Rfl: 1  •  pantoprazole (PROTONIX) 40 MG EC tablet, TAKE 1 TABLET BY MOUTH DAILY, Disp: 90 tablet, Rfl: 0  •  simvastatin (ZOCOR) 20 MG tablet, TAKE 1 TABLET BY MOUTH EVERY NIGHT, Disp: 90 tablet, Rfl: 0  •  SITagliptin (JANUVIA) 100 MG tablet, Take 1 tablet by mouth Daily., Disp: 90 tablet, Rfl: 1  •  Empagliflozin-metFORMIN HCl ER (SYNJARDY XR) 5-1000 MG tablet sustained-release 24 hour, Take 10-2,000 mg by mouth Daily., Disp: 180 tablet, Rfl: 1  •  glucose blood (ACCU-CHEK BRISA PLUS) test strip, Check BS TID, Disp: 300 each, Rfl: 3    Current Facility-Administered Medications:   •  meclizine (ANTIVERT) tablet 25 mg, 25 mg, Oral, TID PRN, Dayna Llamas MD    Review of Systems  Review of Systems   Constitutional: Positive for fatigue.   Cardiovascular: Negative for chest pain.   Endocrine: Positive for polydipsia. Negative  for polyphagia and polyuria.   Skin: Negative for pallor.   Neurological: Negative for dizziness, seizures, speech difficulty and weakness.   Psychiatric/Behavioral: Negative for confusion.   All other systems reviewed and are negative.       Physical Exam    /70   Pulse 52   Wt 132 kg (290 lb)   SpO2 99%   BMI 44.09 kg/m² Body mass index is 44.09 kg/m².  Physical Exam   Constitutional: She is oriented to person, place, and time. She appears well-developed. No distress.   HENT:   Head: Normocephalic.   Right Ear: External ear normal.   Left Ear: External ear normal.   Mouth/Throat: No oropharyngeal exudate.   Eyes: Conjunctivae and lids are normal. Right eye exhibits no discharge. Left eye exhibits no discharge. Right pupil is reactive. Left pupil is reactive.   Neck: No JVD present. No tracheal deviation present. No thyroid mass and no thyromegaly present.   Cardiovascular: Normal rate, regular rhythm, normal heart sounds and intact distal pulses.   No murmur heard.  Pulmonary/Chest: Effort normal and breath sounds normal. No respiratory distress. She has no wheezes.   Abdominal: Soft. Bowel sounds are normal. There is no tenderness.   Musculoskeletal: She exhibits no edema or tenderness.    Ias had a diabetic foot exam performed today.   During the foot exam she had a monofilament test performed.    Neurological Sensory Findings -  Altered sharp/dull right ankle/foot discrimination and altered sharp/dull left ankle/foot discrimination.  Vascular Status -  Her right foot exhibits normal foot vasculature  and no edema. Her left foot exhibits normal foot vasculature  and no edema.  Skin Integrity  -  Her right foot skin is intact.  She has no right foot onychomycosis, no right foot ulcer and non-callous right foot.Her left foot skin is intact. She has no left foot onychomycosis, no left foot ulcer and non-callous left foot..  Lymphadenopathy:     She has no cervical adenopathy.   Neurological: She is  alert and oriented to person, place, and time.   Skin: Skin is warm, dry and intact. No rash noted. She is not diaphoretic. No erythema.   Psychiatric: She has a normal mood and affect. Her speech is normal and behavior is normal. Thought content normal.       Labs and Imaging   Lab Results   Component Value Date    HGBA1C 8.5 09/30/2019    HGBA1C 8.9 08/28/2019    HGBA1C 10 06/11/2019     Office Visit on 09/30/2019   Component Date Value Ref Range Status   • Glucose 09/30/2019 130  70 - 130 mg/dL Final   • Hemoglobin A1C 09/30/2019 8.5  % Final     No images are attached to the encounter or orders placed in the encounter.  No Images in the past 120 days found..    Assessment / Plan   Isa was seen today for follow-up.    Diagnoses and all orders for this visit:    Uncontrolled type 2 diabetes mellitus with hyperglycemia (CMS/HCC)  -     POC Glucose Fingerstick  -     POC Glycosylated Hemoglobin (Hb A1C)  -     Lipid Panel  -     Comprehensive Metabolic Panel  -     glucose blood (ACCU-CHEK BRISA PLUS) test strip; Check BS TID  -     SITagliptin (JANUVIA) 100 MG tablet; Take 1 tablet by mouth Daily.  -     Insulin Glargine (LANTUS SOLOSTAR) 100 UNIT/ML injection pen; Inject 30 Units under the skin into the appropriate area as directed Every Night.    Mixed hyperlipidemia    Diabetic peripheral neuropathy associated with type 2 diabetes mellitus (CMS/HCC)    Other orders  -     Empagliflozin-metFORMIN HCl ER (SYNJARDY XR) 5-1000 MG tablet sustained-release 24 hour; Take 10-2,000 mg by mouth Daily.        Diabetes Mellitus 2 is under poor control.  -A1c 8.5, goal <7  -discussed diet  -change metformin ER 1000mg QD to Synjardy ER 5-1000mg 2 tab daily. She did not tolerate higher dose Jardiance, but did fine on the 10mg.  -continue januvia 100mg daily. Consider going back to trulicity 0.75mg, generally works better than DPP4i. She did not tolerate higher dose trulicity.  -increase lantus to 30u qhs  -check bs fasting  and hs. Bring meter/log to f/u     1.  Diet: 3-4 carb servings per meal for females, 4-5 carb servings per meal for males  Spread carb intake throughout the day  Increase lean protein and vegetable intake  Avoid sugary drinks and processed carbs including crackers, cookies, cakes  2.  Exercise: Recommend at least 30 minutes of exercise daily, at least 5 days per week. Increase exercise gradually.   3.  Blood Glucose Goal: Blood glucose goal <150 fasting, <180 2 hr postprandial  4.  Microalbumin due 12/2019  5.  Education performed during this visit: long term diabetic complications discussed. , annual eye examinations at Ophthalmology discussed, dental hygiene discussed  and foot care reviewed., home glucose monitoring emphasized, all medications, side effects and compliance discussed carefully and Hypoglycemia management and prevention reviewed. Reviewed ‘ABCs’ of diabetes management (respective goals in parentheses):  A1C (<7), blood pressure (<130/80), and cholesterol (LDL <100, if CVD <70).    Hyperlipidemia  -LDL not within guidelines  -recheck lipids today, pt is fasting, and will likely increase simvastatin    Peripheral neuropathy  -controlled on gabapentin 100mg BID, rx by [facility]      Patient Instructions   Change metformin to Synjardy extended release 5-1000mg 2 pills daily - let me know if you have issue with yeast infections  Continue Januvia 100mg daily  Increase Lantus to 30 units daily      Follow up: Return in about 3 months (around 12/30/2019).    Discussed the nature of the disease including, risks, complications, implications, management, safe and proper use of medications. Encouraged therapeutic lifestyle changes including low calorie diet with plenty of fruits and vegetables, daily exercise, medication compliance, and keeping scheduled follow up appointments with me and any other providers. Encouraged patient to have appointment for complete physical, fasting labs, appropriate  screenings, and immunizations on an annual basis.    25 min  of 45 min face-to-face visit time spent for coordination of care and counselling regarding identified problems as outlined in the objective, assessment and discussion portions of the documentation.    Emily Hanson PA-C

## 2019-10-01 DIAGNOSIS — E78.2 MIXED HYPERLIPIDEMIA: Primary | ICD-10-CM

## 2019-10-01 DIAGNOSIS — E78.5 HYPERLIPIDEMIA LDL GOAL <70: ICD-10-CM

## 2019-10-01 RX ORDER — SIMVASTATIN 40 MG
40 TABLET ORAL NIGHTLY
Qty: 90 TABLET | Refills: 1 | Status: SHIPPED | OUTPATIENT
Start: 2019-10-01 | End: 2020-08-31

## 2019-10-15 DIAGNOSIS — I10 ESSENTIAL HYPERTENSION: ICD-10-CM

## 2019-10-16 RX ORDER — METFORMIN HYDROCHLORIDE EXTENDED-RELEASE TABLETS 1000 MG/1
TABLET, FILM COATED, EXTENDED RELEASE ORAL
Qty: 30 TABLET | Refills: 0 | Status: SHIPPED | OUTPATIENT
Start: 2019-10-16 | End: 2019-12-02

## 2019-10-16 RX ORDER — AMLODIPINE BESYLATE 5 MG/1
5 TABLET ORAL DAILY
Qty: 30 TABLET | Refills: 0 | Status: SHIPPED | OUTPATIENT
Start: 2019-10-16 | End: 2019-12-14 | Stop reason: SDUPTHER

## 2019-12-01 DIAGNOSIS — R10.84 GENERALIZED ABDOMINAL PAIN: ICD-10-CM

## 2019-12-02 ENCOUNTER — OFFICE VISIT (OUTPATIENT)
Dept: FAMILY MEDICINE CLINIC | Facility: CLINIC | Age: 54
End: 2019-12-02

## 2019-12-02 VITALS
HEIGHT: 68 IN | OXYGEN SATURATION: 98 % | SYSTOLIC BLOOD PRESSURE: 100 MMHG | HEART RATE: 69 BPM | TEMPERATURE: 97.8 F | BODY MASS INDEX: 43.35 KG/M2 | DIASTOLIC BLOOD PRESSURE: 62 MMHG | WEIGHT: 286 LBS

## 2019-12-02 DIAGNOSIS — I10 ESSENTIAL HYPERTENSION: ICD-10-CM

## 2019-12-02 DIAGNOSIS — E11.42 DIABETIC PERIPHERAL NEUROPATHY ASSOCIATED WITH TYPE 2 DIABETES MELLITUS (HCC): Primary | ICD-10-CM

## 2019-12-02 LAB
EXPIRATION DATE: NORMAL
HBA1C MFR BLD: 7.2 %
Lab: NORMAL

## 2019-12-02 PROCEDURE — 83036 HEMOGLOBIN GLYCOSYLATED A1C: CPT | Performed by: FAMILY MEDICINE

## 2019-12-02 PROCEDURE — 99213 OFFICE O/P EST LOW 20 MIN: CPT | Performed by: FAMILY MEDICINE

## 2019-12-02 RX ORDER — PANTOPRAZOLE SODIUM 40 MG/1
TABLET, DELAYED RELEASE ORAL
Qty: 90 TABLET | Refills: 0 | Status: SHIPPED | OUTPATIENT
Start: 2019-12-02 | End: 2020-05-04

## 2019-12-02 NOTE — PROGRESS NOTES
Subjective   Isa Vogel is a 54 y.o. female.   diab follow up    Diabetes   She presents for her follow-up diabetic visit. She has type 2 diabetes mellitus. Her disease course has been improving. There are no hypoglycemic associated symptoms. There are no diabetic associated symptoms. There are no hypoglycemic complications. Symptoms are improving. Risk factors for coronary artery disease include diabetes mellitus, dyslipidemia and obesity. Current diabetic treatment includes oral agent (triple therapy). She is compliant with treatment all of the time. Her weight is decreasing steadily. She is following a diabetic diet. Meal planning includes avoidance of concentrated sweets. She has had a previous visit with a dietitian. She participates in exercise intermittently. Her home blood glucose trend is decreasing steadily. Her breakfast blood glucose is taken between 7-8 am. Her breakfast blood glucose range is generally 110-130 mg/dl. An ACE inhibitor/angiotensin II receptor blocker is contraindicated. She sees a podiatrist.Eye exam is current.      Reviewed previous records from endocrinology 3 months ago where she was initiated on Synjardy.  She is tolerating that medication well without side effects.  Since starting the medication she has noticed some decrease in appetite and some minimal weight loss.    The following portions of the patient's history were reviewed and updated as appropriate: allergies, current medications, past family history, past medical history, past social history, past surgical history and problem list.    Review of Systems   Constitutional: Negative.    HENT: Negative.    Eyes: Negative.    Respiratory: Negative.    Cardiovascular: Negative.    Gastrointestinal: Negative.    Endocrine: Negative.    Genitourinary: Negative.    Musculoskeletal: Negative.    Skin: Negative.    Allergic/Immunologic: Negative.    Neurological: Negative.    Hematological: Negative.    Psychiatric/Behavioral:  "Negative.    All other systems reviewed and are negative.      Objective     Vitals:    12/02/19 1634   BP: 100/62   Pulse: 69   Temp: 97.8 °F (36.6 °C)   SpO2: 98%   Weight: 130 kg (286 lb)   Height: 172.7 cm (68\")       Physical Exam   Constitutional: She appears well-developed and well-nourished.   Neck: Normal range of motion. Neck supple. No thyromegaly present.   Cardiovascular: Normal rate and regular rhythm. Exam reveals no friction rub.   No murmur heard.  Pulmonary/Chest: Effort normal and breath sounds normal. No stridor. She has no wheezes. She has no rales.   Psychiatric: She has a normal mood and affect.   Nursing note and vitals reviewed.      Assessment/Plan     Problem List Items Addressed This Visit        Cardiovascular and Mediastinum    Essential hypertension       Endocrine    Diabetic peripheral neuropathy associated with type 2 diabetes mellitus (CMS/HCC) - Primary    Relevant Orders    POC Glycosylated Hemoglobin (Hb A1C) (Completed)        poct a1c 7.2 today.   "

## 2019-12-14 DIAGNOSIS — I10 ESSENTIAL HYPERTENSION: ICD-10-CM

## 2019-12-16 RX ORDER — AMLODIPINE BESYLATE 5 MG/1
5 TABLET ORAL DAILY
Qty: 30 TABLET | Refills: 0 | Status: SHIPPED | OUTPATIENT
Start: 2019-12-16 | End: 2020-01-13

## 2020-01-04 ENCOUNTER — OFFICE VISIT (OUTPATIENT)
Dept: RETAIL CLINIC | Facility: CLINIC | Age: 55
End: 2020-01-04

## 2020-01-04 VITALS
DIASTOLIC BLOOD PRESSURE: 86 MMHG | RESPIRATION RATE: 20 BRPM | TEMPERATURE: 98.3 F | SYSTOLIC BLOOD PRESSURE: 126 MMHG | WEIGHT: 287.6 LBS | OXYGEN SATURATION: 99 % | BODY MASS INDEX: 43.73 KG/M2 | HEART RATE: 50 BPM

## 2020-01-04 DIAGNOSIS — R68.89 FLU-LIKE SYMPTOMS: Primary | ICD-10-CM

## 2020-01-04 DIAGNOSIS — J06.9 ACUTE URI: ICD-10-CM

## 2020-01-04 PROCEDURE — 87804 INFLUENZA ASSAY W/OPTIC: CPT | Performed by: NURSE PRACTITIONER

## 2020-01-04 PROCEDURE — 99213 OFFICE O/P EST LOW 20 MIN: CPT | Performed by: NURSE PRACTITIONER

## 2020-01-04 RX ORDER — FLUOCINOLONE ACETONIDE 0.11 MG/ML
OIL AURICULAR (OTIC)
COMMUNITY

## 2020-01-04 RX ORDER — ALBUTEROL SULFATE 90 UG/1
2 AEROSOL, METERED RESPIRATORY (INHALATION) EVERY 4 HOURS PRN
Qty: 1 INHALER | Refills: 0 | Status: SHIPPED | OUTPATIENT
Start: 2020-01-04 | End: 2020-01-19

## 2020-01-04 RX ORDER — BENZONATATE 200 MG/1
200 CAPSULE ORAL 3 TIMES DAILY PRN
Qty: 21 CAPSULE | Refills: 0 | Status: SHIPPED | OUTPATIENT
Start: 2020-01-04 | End: 2020-01-11

## 2020-01-04 RX ORDER — FLUTICASONE PROPIONATE 50 MCG
1 SPRAY, SUSPENSION (ML) NASAL DAILY
Qty: 1 BOTTLE | Refills: 0 | Status: SHIPPED | OUTPATIENT
Start: 2020-01-04 | End: 2020-01-14

## 2020-01-04 NOTE — PATIENT INSTRUCTIONS
"Upper Respiratory Infection, Adult  An upper respiratory infection (URI) affects the nose, throat, and upper air passages. URIs are caused by germs (viruses). The most common type of URI is often called \"the common cold.\"  Medicines cannot cure URIs, but you can do things at home to relieve your symptoms. URIs usually get better within 7-10 days.  Follow these instructions at home:  Activity  · Rest as needed.  · If you have a fever, stay home from work or school until your fever is gone, or until your doctor says you may return to work or school.  ? You should stay home until you cannot spread the infection anymore (you are not contagious).  ? Your doctor may have you wear a face mask so you have less risk of spreading the infection.  Relieving symptoms  · Gargle with a salt-water mixture 3-4 times a day or as needed. To make a salt-water mixture, completely dissolve ½-1 tsp of salt in 1 cup of warm water.  · Use a cool-mist humidifier to add moisture to the air. This can help you breathe more easily.  Eating and drinking    · Drink enough fluid to keep your pee (urine) pale yellow.  · Eat soups and other clear broths.  General instructions    · Take over-the-counter and prescription medicines only as told by your doctor. These include cold medicines, fever reducers, and cough suppressants.  · Do not use any products that contain nicotine or tobacco. These include cigarettes and e-cigarettes. If you need help quitting, ask your doctor.  · Avoid being where people are smoking (avoid secondhand smoke).  · Make sure you get regular shots and get the flu shot every year.  · Keep all follow-up visits as told by your doctor. This is important.  How to avoid spreading infection to others    · Wash your hands often with soap and water. If you do not have soap and water, use hand .  · Avoid touching your mouth, face, eyes, or nose.  · Cough or sneeze into a tissue or your sleeve or elbow. Do not cough or sneeze " "into your hand or into the air.  Contact a doctor if:  · You are getting worse, not better.  · You have any of these:  ? A fever.  ? Chills.  ? Brown or red mucus in your nose.  ? Yellow or brown fluid (discharge)coming from your nose.  ? Pain in your face, especially when you bend forward.  ? Swollen neck glands.  ? Pain with swallowing.  ? White areas in the back of your throat.  Get help right away if:  · You have shortness of breath that gets worse.  · You have very bad or constant:  ? Headache.  ? Ear pain.  ? Pain in your forehead, behind your eyes, and over your cheekbones (sinus pain).  ? Chest pain.  · You have long-lasting (chronic) lung disease along with any of these:  ? Wheezing.  ? Long-lasting cough.  ? Coughing up blood.  ? A change in your usual mucus.  · You have a stiff neck.  · You have changes in your:  ? Vision.  ? Hearing.  ? Thinking.  ? Mood.  Summary  · An upper respiratory infection (URI) is caused by a germ called a virus. The most common type of URI is often called \"the common cold.\"  · URIs usually get better within 7-10 days.  · Take over-the-counter and prescription medicines only as told by your doctor.  This information is not intended to replace advice given to you by your health care provider. Make sure you discuss any questions you have with your health care provider.  Document Released: 06/05/2009 Document Revised: 08/10/2018 Document Reviewed: 08/10/2018  [employer] Interactive Patient Education © 2019 [employer]    "

## 2020-01-04 NOTE — PROGRESS NOTES
Flu Symptoms      Subjective   Isa Vogel is a 54 y.o. female.     Flu Symptoms   This is a new problem. The current episode started yesterday. The problem occurs constantly. The problem has been gradually worsening. Associated symptoms include chills, congestion, coughing, fatigue, myalgias and a sore throat. Pertinent negatives include no abdominal pain, arthralgias, chest pain, diaphoresis, fever, headaches, joint swelling, nausea, neck pain, numbness, rash, swollen glands, vertigo, vomiting or weakness. Nothing aggravates the symptoms. She has tried acetaminophen for the symptoms. The treatment provided no relief.        Patient Active Problem List   Diagnosis   • Diabetes mellitus (CMS/[facility])   • Type 2 diabetes mellitus without complication (CMS/[facility])   • Essential hypertension   • Morbid obesity due to excess calories (CMS/[facility])   • Routine health maintenance   • Mild intermittent asthma   • Annual physical exam   • Breast cancer screening   • Postmenopausal   • Mixed hyperlipidemia   • Benign paroxysmal positional vertigo of left ear   • Labyrinthitis of both ears   • Vertigo   • Lice infested hair   • Generalized abdominal pain   • Reactive depression   • Dysfunction of left eustachian tube   • Visual impairment   • Kidney disease   • Heart disease   • GERD (gastroesophageal reflux disease)   • Diverticulosis   • COPD (chronic obstructive pulmonary disease) (CMS/[facility])   • Bell's palsy   • Asthma   • Acute midline low back pain without sciatica   • Urine abnormality   • Yeast vaginitis   • Bronchitis   • Fever   • Bilateral otitis media with effusion   • Diarrhea   • Anxiety   • Uncontrolled type 2 diabetes mellitus with diabetic neuropathy, without long-term current use of insulin (CMS/[facility])   • Diabetic peripheral neuropathy associated with type 2 diabetes mellitus (CMS/[facility])       Allergies   Allergen Reactions   • Lisinopril    • Tramadol         Current Outpatient Medications on File Prior to Visit    Medication Sig Dispense Refill   • amLODIPine (NORVASC) 5 MG tablet TAKE 1 TABLET BY MOUTH DAILY 30 tablet 0   • chlorthalidone (HYGROTON) 25 MG tablet Take 1 tablet by mouth Daily. 90 tablet 3   • citalopram (CeleXA) 40 MG tablet Take 1 tablet by mouth Daily. 90 tablet 3   • Empagliflozin-metFORMIN HCl ER (SYNJARDY XR) 5-1000 MG tablet sustained-release 24 hour Take 10-2,000 mg by mouth Daily. 180 tablet 1   • gabapentin (NEURONTIN) 100 MG capsule TK ONE C PO BID  2   • Insulin Glargine (LANTUS SOLOSTAR) 100 UNIT/ML injection pen Inject 30 Units under the skin into the appropriate area as directed Every Night. 9 pen 1   • pantoprazole (PROTONIX) 40 MG EC tablet TAKE 1 TABLET BY MOUTH DAILY 90 tablet 0   • simvastatin (ZOCOR) 40 MG tablet Take 1 tablet by mouth Every Night. 90 tablet 1   • SITagliptin (JANUVIA) 100 MG tablet Take 1 tablet by mouth Daily. 90 tablet 1   • albuterol (PROVENTIL) (2.5 MG/3ML) 0.083% nebulizer solution Take 2.5 mg by nebulization every 4 (four) hours as needed for wheezing.     • B-D UF III MINI PEN NEEDLES 31G X 5 MM misc USE AS DIRECTED TWICE DAILY 100 each 0   • fluocinolone acetonide (DERMOTIC) 0.01 % oil otic oil DermOtic Oil 0.01 % ear drops   INSTILL 4 DROPS INTO AFFECTED EAR(S) BY OTIC ROUTE ONCE DAILY X 10 DAYS, THEN PRN     • glucose blood (ACCU-CHEK BRISA PLUS) test strip Check BS  each 3   • guanFACINE (TENEX) 1 MG tablet Take 1 tablet by mouth Daily As Needed (anxiety). 45 tablet 3   • Lancet Device misc 1 each 2 (Two) Times a Day. 100 each 0   • ondansetron (ZOFRAN) 4 MG tablet Take 1 tablet by mouth Every 8 (Eight) Hours As Needed for Nausea or Vomiting. 12 tablet 1   • Unable to find 1 each 1 (One) Time. Med Name: red 2. Oral capsule     • [DISCONTINUED] albuterol (PROVENTIL HFA;VENTOLIN HFA) 108 (90 BASE) MCG/ACT inhaler Inhale 2 puffs every 4 (four) hours as needed for wheezing. 1 inhaler 11     Current Facility-Administered Medications on File Prior to Visit    Medication Dose Route Frequency Provider Last Rate Last Dose   • meclizine (ANTIVERT) tablet 25 mg  25 mg Oral TID PRN Dayna Llamas MD           Past Medical History:   Diagnosis Date   • Allergic    • Asthma    • Bell's palsy    • COPD (chronic obstructive pulmonary disease) (CMS/[facility])    • Diabetes mellitus (CMS/[facility])    • Diverticulosis    • Essential hypertension 8/3/2016   • GERD (gastroesophageal reflux disease)    • Heart disease    • Kidney disease    • Obesity    • Psoriasis    • Visual impairment        Past Surgical History:   Procedure Laterality Date   • ADENOIDECTOMY     • CARDIAC ABLATION     • HYSTERECTOMY     • OOPHORECTOMY     • TONSILLECTOMY     • TUBAL ABDOMINAL LIGATION         Family History   Problem Relation Age of Onset   • Hypertension Mother    • Diabetes Mother    • Hypertension Father    • No Known Problems Maternal Grandmother    • No Known Problems Maternal Grandfather    • No Known Problems Paternal Grandmother    • No Known Problems Paternal Grandfather    • Ovarian cancer Sister        Social History     Socioeconomic History   • Marital status:      Spouse name: Not on file   • Number of children: Not on file   • Years of education: Not on file   • Highest education level: Not on file   Tobacco Use   • Smoking status: Never Smoker   • Smokeless tobacco: Never Used   Substance and Sexual Activity   • Alcohol use: No   • Drug use: No   • Sexual activity: Never   Social History Narrative    PT IS  WITH 2 CHILDREN. 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       Travel:  No recent travel within the last 21 days outside the [place] Denies recent travel to one of the following [place]ries:  [place], [place], [place], or [place].  Denies contact with anyone who has traveled to one of the following [place]  Countries: [place], [place], [place], or [place] within the last 21 days and is known or suspected to have Ebola.  Denies having had any  contact with the human remains, blood or any bodily fluids of someone who is known or suspected to have Ebola within the last 21 days.     OB History        3    Para   2    Term   2            AB        Living   2       SAB        TAB        Ectopic        Molar        Multiple        Live Births                    Review of Systems   Constitutional: Positive for chills and fatigue. Negative for diaphoresis and fever.   HENT: Positive for congestion and sore throat. Negative for ear discharge, ear pain, postnasal drip, rhinorrhea, sinus pressure, sinus pain, sneezing, tinnitus, trouble swallowing and voice change.    Respiratory: Positive for cough and shortness of breath (with exertion). Negative for chest tightness and wheezing.    Cardiovascular: Negative for chest pain.   Gastrointestinal: Negative for abdominal pain, nausea and vomiting.   Musculoskeletal: Positive for myalgias. Negative for arthralgias, joint swelling and neck pain.   Skin: Negative for rash.   Neurological: Negative for vertigo, weakness, numbness and headaches.   All other systems reviewed and are negative.      /86   Pulse 50   Temp 98.3 °F (36.8 °C) (Oral)   Resp 20   Wt 130 kg (287 lb 9.6 oz)   SpO2 99%   Breastfeeding No   BMI 43.73 kg/m²     Objective   Physical Exam   Constitutional: She is oriented to person, place, and time. She appears well-developed and well-nourished. No distress.   HENT:   Head: Normocephalic and atraumatic.   Right Ear: Hearing, external ear and ear canal normal. Tympanic membrane is injected.   Left Ear: Hearing, external ear and ear canal normal. Tympanic membrane is injected.   Nose: Mucosal edema present. No rhinorrhea. Right sinus exhibits no maxillary sinus tenderness and no frontal sinus tenderness. Left sinus exhibits no maxillary sinus tenderness and no frontal sinus tenderness.   Mouth/Throat: Uvula is midline, oropharynx is clear and moist and mucous membranes are normal.  No oropharyngeal exudate, posterior oropharyngeal edema, posterior oropharyngeal erythema or tonsillar abscesses. Tonsils are 0 on the right. Tonsils are 0 on the left. No tonsillar exudate.   Eyes: Pupils are equal, round, and reactive to light. Conjunctivae and EOM are normal. Right eye exhibits no discharge. Left eye exhibits no discharge. No scleral icterus.   Neck: Normal range of motion. Neck supple.   Cardiovascular: Regular rhythm. Bradycardia present.   Pulmonary/Chest: Effort normal. No stridor. No tachypnea. No respiratory distress. She has decreased breath sounds in the right upper field, the right middle field, the right lower field, the left upper field, the left middle field and the left lower field. She has no wheezes. She has no rhonchi. She has no rales.   Dry cough noted on exam     Musculoskeletal: Normal range of motion.   Lymphadenopathy:     She has no cervical adenopathy.   Neurological: She is alert and oriented to person, place, and time.   Skin: Skin is warm. No rash noted. She is not diaphoretic.   Psychiatric: She has a normal mood and affect. Her behavior is normal.   Vitals reviewed.      Assessment/Plan   Isa was seen today for flu symptoms.    Diagnoses and all orders for this visit:    Flu-like symptoms  -     POC Influenza A / B    Acute URI  -     benzonatate (TESSALON) 200 MG capsule; Take 1 capsule by mouth 3 (Three) Times a Day As Needed for Cough for up to 7 days.  -     albuterol sulfate  (90 Base) MCG/ACT inhaler; Inhale 2 puffs Every 4 (Four) Hours As Needed for Shortness of Air for up to 15 days.  -     fluticasone (FLONASE) 50 MCG/ACT nasal spray; 1 spray into the nostril(s) as directed by provider Daily for 10 days.    Plan of care discussed with patient: Flu test is negative;  instructed to rest, get plenty of clear decaffeinated fluids, alternate Tylenol and Motrin for pain; follow up with PCP for no improvement; go to ER for new or worsening symptoms.  Verbalized understanding.    Results for orders placed or performed in visit on 01/04/20   POC Influenza A / B   Result Value Ref Range    Rapid Influenza A Ag Negative Negative    Rapid Influenza B Ag Negative Negative    Internal Control Passed Passed    Lot Number [id number]     Expiration Date 12/11/21        No follow-ups on file.

## 2020-01-06 ENCOUNTER — OFFICE VISIT (OUTPATIENT)
Dept: ENDOCRINOLOGY | Facility: CLINIC | Age: 55
End: 2020-01-06

## 2020-01-06 VITALS
OXYGEN SATURATION: 99 % | SYSTOLIC BLOOD PRESSURE: 128 MMHG | HEART RATE: 55 BPM | WEIGHT: 288.6 LBS | DIASTOLIC BLOOD PRESSURE: 82 MMHG | BODY MASS INDEX: 43.88 KG/M2

## 2020-01-06 DIAGNOSIS — E11.65 UNCONTROLLED TYPE 2 DIABETES MELLITUS WITH HYPERGLYCEMIA (HCC): Primary | ICD-10-CM

## 2020-01-06 DIAGNOSIS — E78.2 MIXED HYPERLIPIDEMIA: ICD-10-CM

## 2020-01-06 DIAGNOSIS — E11.42 DIABETIC PERIPHERAL NEUROPATHY ASSOCIATED WITH TYPE 2 DIABETES MELLITUS (HCC): ICD-10-CM

## 2020-01-06 LAB
ALBUMIN SERPL-MCNC: 4 G/DL (ref 3.5–5.2)
ALBUMIN/GLOB SERPL: 1.3 G/DL
ALP SERPL-CCNC: 54 U/L (ref 39–117)
ALT SERPL W P-5'-P-CCNC: 34 U/L (ref 1–33)
ANION GAP SERPL CALCULATED.3IONS-SCNC: 10.2 MMOL/L (ref 5–15)
AST SERPL-CCNC: 24 U/L (ref 1–32)
BILIRUB SERPL-MCNC: 0.5 MG/DL (ref 0.2–1.2)
BUN BLD-MCNC: 10 MG/DL (ref 6–20)
BUN/CREAT SERPL: 9.7 (ref 7–25)
CALCIUM SPEC-SCNC: 9.4 MG/DL (ref 8.6–10.5)
CHLORIDE SERPL-SCNC: 101 MMOL/L (ref 98–107)
CHOLEST SERPL-MCNC: 191 MG/DL (ref 0–200)
CO2 SERPL-SCNC: 29.8 MMOL/L (ref 22–29)
CREAT BLD-MCNC: 1.03 MG/DL (ref 0.57–1)
GFR SERPL CREATININE-BSD FRML MDRD: 56 ML/MIN/1.73
GLOBULIN UR ELPH-MCNC: 3 GM/DL
GLUCOSE BLD-MCNC: 187 MG/DL (ref 65–99)
GLUCOSE BLDC GLUCOMTR-MCNC: 186 MG/DL (ref 70–130)
HDLC SERPL-MCNC: 40 MG/DL (ref 40–60)
LDLC SERPL CALC-MCNC: 105 MG/DL (ref 0–100)
LDLC/HDLC SERPL: 2.63 {RATIO}
POTASSIUM BLD-SCNC: 4 MMOL/L (ref 3.5–5.2)
PROT SERPL-MCNC: 7 G/DL (ref 6–8.5)
SODIUM BLD-SCNC: 141 MMOL/L (ref 136–145)
TRIGL SERPL-MCNC: 229 MG/DL (ref 0–150)
TSH SERPL DL<=0.05 MIU/L-ACNC: 1.88 UIU/ML (ref 0.27–4.2)
VLDLC SERPL-MCNC: 45.8 MG/DL (ref 5–40)

## 2020-01-06 PROCEDURE — 82043 UR ALBUMIN QUANTITATIVE: CPT | Performed by: PHYSICIAN ASSISTANT

## 2020-01-06 PROCEDURE — 80053 COMPREHEN METABOLIC PANEL: CPT | Performed by: PHYSICIAN ASSISTANT

## 2020-01-06 PROCEDURE — 82570 ASSAY OF URINE CREATININE: CPT | Performed by: PHYSICIAN ASSISTANT

## 2020-01-06 PROCEDURE — 80061 LIPID PANEL: CPT | Performed by: PHYSICIAN ASSISTANT

## 2020-01-06 PROCEDURE — 84443 ASSAY THYROID STIM HORMONE: CPT | Performed by: PHYSICIAN ASSISTANT

## 2020-01-06 PROCEDURE — 82947 ASSAY GLUCOSE BLOOD QUANT: CPT | Performed by: PHYSICIAN ASSISTANT

## 2020-01-06 PROCEDURE — 99214 OFFICE O/P EST MOD 30 MIN: CPT | Performed by: PHYSICIAN ASSISTANT

## 2020-01-06 NOTE — PROGRESS NOTES
"Chief Complaint  F/u for Diabetes Mellitus.    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 The initial diagnosis of diabetes was made 2016.    Started drinking [employer]eps[employer] again. Has been stressed recently, daughter who has mental health issues recently had a manic episode.     A1C- 7.2 (12/2/19), 8.5 (9/30/19), 8.8 (8/28/19), 8.3 (3/11/19), 10.4 (12/11/18)    Diabetic complications: cardiovascular disease- pt reports hx of \"mild heart attack\" that did not require intervention, peripheral neuropathy controlled on gabapentin 100mg BID, rx by [facility]  Eye exam current (within one year): utd  Foot care and dental care: discussed, follows with podiatry    Current diabetic medications include:  Synjardy XR 5-1000mg 2 tab daily, did not tolerate higher dose of Jardiance in the past  Januvia 100mg QD  Lantus 30u qhs    Statin: Simvastatin 40, dose increased 9/2019    Past medications: Trulicity (did not tolerate higher dose, tolerated 0.75mg), jardiance (severe yeast infection with 25mg, tolerated 10mg)    Diabetic Monitoring  -   No meter or log for review. Pt reports -180. Denies hypoglycemia     Nutrition:     Current diet: in general, an \"unhealthy\" diet, on average, 2 meals per day plus snacks.   Current exercise: none    The following portions of the patient's history were reviewed and updated by me as appropriate: allergies, current medications, past family history, past social history, past surgical history and problem list.      Past Medical History:   Diagnosis Date   • Allergic    • Asthma    • Bell's palsy    • COPD (chronic obstructive pulmonary disease) (CMS/[facility])    • Diabetes mellitus (CMS/[facility])    • Diverticulosis    • Essential hypertension 8/3/2016   • GERD (gastroesophageal reflux disease)    • Heart disease    • Kidney disease    • Obesity    • Psoriasis    • Visual impairment        Medications    Current Outpatient " Medications:   •  albuterol (PROVENTIL) (2.5 MG/3ML) 0.083% nebulizer solution, Take 2.5 mg by nebulization every 4 (four) hours as needed for wheezing., Disp: , Rfl:   •  albuterol sulfate  (90 Base) MCG/ACT inhaler, Inhale 2 puffs Every 4 (Four) Hours As Needed for Shortness of Air for up to 15 days., Disp: 1 inhaler, Rfl: 0  •  amLODIPine (NORVASC) 5 MG tablet, TAKE 1 TABLET BY MOUTH DAILY, Disp: 30 tablet, Rfl: 0  •  B-D UF III MINI PEN NEEDLES 31G X 5 MM misc, USE AS DIRECTED TWICE DAILY, Disp: 100 each, Rfl: 0  •  benzonatate (TESSALON) 200 MG capsule, Take 1 capsule by mouth 3 (Three) Times a Day As Needed for Cough for up to 7 days., Disp: 21 capsule, Rfl: 0  •  chlorthalidone (HYGROTON) 25 MG tablet, Take 1 tablet by mouth Daily., Disp: 90 tablet, Rfl: 3  •  citalopram (CeleXA) 40 MG tablet, Take 1 tablet by mouth Daily., Disp: 90 tablet, Rfl: 3  •  Empagliflozin-metFORMIN HCl ER (SYNJARDY XR) 5-1000 MG tablet sustained-release 24 hour, Take 10-2,000 mg by mouth Daily., Disp: 180 tablet, Rfl: 1  •  fluocinolone acetonide (DERMOTIC) 0.01 % oil otic oil, DermOtic Oil 0.01 % ear drops  INSTILL 4 DROPS INTO AFFECTED EAR(S) BY OTIC ROUTE ONCE DAILY X 10 DAYS, THEN PRN, Disp: , Rfl:   •  fluticasone (FLONASE) 50 MCG/ACT nasal spray, 1 spray into the nostril(s) as directed by provider Daily for 10 days., Disp: 1 bottle, Rfl: 0  •  gabapentin (NEURONTIN) 100 MG capsule, TK ONE C PO BID, Disp: , Rfl: 2  •  glucose blood (ACCU-CHEK BRISA PLUS) test strip, Check BS TID, Disp: 300 each, Rfl: 3  •  guanFACINE (TENEX) 1 MG tablet, Take 1 tablet by mouth Daily As Needed (anxiety)., Disp: 45 tablet, Rfl: 3  •  Insulin Glargine (LANTUS SOLOSTAR) 100 UNIT/ML injection pen, Inject 30 Units under the skin into the appropriate area as directed Every Night., Disp: 9 pen, Rfl: 1  •  Lancet Device misc, 1 each 2 (Two) Times a Day., Disp: 100 each, Rfl: 0  •  ondansetron (ZOFRAN) 4 MG tablet, Take 1 tablet by mouth Every 8  (Eight) Hours As Needed for Nausea or Vomiting., Disp: 12 tablet, Rfl: 1  •  pantoprazole (PROTONIX) 40 MG EC tablet, TAKE 1 TABLET BY MOUTH DAILY, Disp: 90 tablet, Rfl: 0  •  simvastatin (ZOCOR) 40 MG tablet, Take 1 tablet by mouth Every Night., Disp: 90 tablet, Rfl: 1  •  SITagliptin (JANUVIA) 100 MG tablet, Take 1 tablet by mouth Daily., Disp: 90 tablet, Rfl: 1  •  Unable to find, 1 each 1 (One) Time. Med Name: red 2. Oral capsule, Disp: , Rfl:     Current Facility-Administered Medications:   •  meclizine (ANTIVERT) tablet 25 mg, 25 mg, Oral, TID PRN, Dayna Llamas MD    Review of Systems  Review of Systems   Constitutional: Positive for fatigue.   Cardiovascular: Negative for chest pain.   Endocrine: Positive for polydipsia. Negative for polyphagia and polyuria.   Skin: Negative for pallor.   Neurological: Negative for dizziness, seizures, speech difficulty and weakness.   Psychiatric/Behavioral: Negative for confusion.   All other systems reviewed and are negative.       Physical Exam    /82   Pulse 55   Wt 131 kg (288 lb 9.6 oz)   SpO2 99%   BMI 43.88 kg/m² Body mass index is 43.88 kg/m².  Physical Exam   Constitutional: She is oriented to person, place, and time. She appears well-developed. No distress.   HENT:   Head: Normocephalic.   Right Ear: External ear normal.   Left Ear: External ear normal.   Mouth/Throat: No oropharyngeal exudate.   Eyes: Conjunctivae and lids are normal. Right eye exhibits no discharge. Left eye exhibits no discharge. Right pupil is reactive. Left pupil is reactive.   Neck: No JVD present. No tracheal deviation present. No thyroid mass and no thyromegaly present.   Cardiovascular: Normal rate, regular rhythm, normal heart sounds and intact distal pulses.   No murmur heard.  Pulmonary/Chest: Effort normal and breath sounds normal. No respiratory distress. She has no wheezes.   Abdominal: Soft. Bowel sounds are normal. There is no tenderness.   Musculoskeletal: She  exhibits no edema or tenderness.   Lymphadenopathy:     She has no cervical adenopathy.   Neurological: She is alert and oriented to person, place, and time.   Skin: Skin is warm, dry and intact. No rash noted. She is not diaphoretic. No erythema.   Psychiatric: She has a normal mood and affect. Her speech is normal and behavior is normal. Thought content normal.       Labs and Imaging   Lab Results   Component Value Date    HGBA1C 7.2 12/02/2019    HGBA1C 8.5 09/30/2019    HGBA1C 8.9 08/28/2019     Office Visit on 01/06/2020   Component Date Value Ref Range Status   • Glucose 01/06/2020 186* 70 - 130 mg/dL Final   Office Visit on 01/04/2020   Component Date Value Ref Range Status   • Rapid Influenza A Ag 01/04/2020 Negative  Negative Final   • Rapid Influenza B Ag 01/04/2020 Negative  Negative Final   • Internal Control 01/04/2020 Passed  Passed Final   • Lot Number 01/04/2020 [id number]   Final   • Expiration Date 01/04/2020 12/11/21   Final     No images are attached to the encounter or orders placed in the encounter.  No Images in the past 120 days found..    Assessment / Plan   Isa was seen today for follow-up.    Diagnoses and all orders for this visit:    Uncontrolled type 2 diabetes mellitus with hyperglycemia (CMS/HCC)  -     POC Glucose Fingerstick  -     Empagliflozin-metFORMIN HCl ER (SYNJARDY XR) 5-1000 MG tablet sustained-release 24 hour; Take 10-2,000 mg by mouth Daily.  -     Insulin Glargine (LANTUS SOLOSTAR) 100 UNIT/ML injection pen; Inject 30 Units under the skin into the appropriate area as directed Every Night.  -     SITagliptin (JANUVIA) 100 MG tablet; Take 1 tablet by mouth Daily.  -     Microalbumin / Creatinine Urine Ratio - Urine, Clean Catch  -     TSH    Diabetic peripheral neuropathy associated with type 2 diabetes mellitus (CMS/HCC)    Mixed hyperlipidemia  -     Lipid Panel  -     Comprehensive Metabolic Panel        Diabetes Mellitus 2 is under fair and improved control.  -with  peripheral neuropathy  -A1c 7.2, down from 8.5 last visit  -A1C has improved, discussed with pt should be able to get <7 if she eliminates sugared drinks  -continue Synjardy ER 5-1000mg 2 tab daily  -continue januvia 100mg daily. Consider going back to trulicity 0.75mg, generally works better than DPP4i. She did not tolerate higher dose trulicity.  -continue lantus 30u qhs  -check bs fasting and hs. Bring meter/log to all visits    1.  Diet: 3-4 carb servings per meal for females, 4-5 carb servings per meal for males  Spread carb intake throughout the day  Increase lean protein and vegetable intake  Avoid sugary drinks and processed carbs including crackers, cookies, cakes  2.  Exercise: Recommend at least 30 minutes of exercise daily, at least 5 days per week. Increase exercise gradually.   3.  Blood Glucose Goal: Blood glucose goal <150 fasting, <180 2 hr postprandial  4.  Microalbumin due 12/2019  5.  Education performed during this visit: long term diabetic complications discussed. , annual eye examinations at Ophthalmology discussed, dental hygiene discussed  and foot care reviewed., home glucose monitoring emphasized, all medications, side effects and compliance discussed carefully and Hypoglycemia management and prevention reviewed. Reviewed ‘ABCs’ of diabetes management (respective goals in parentheses):  A1C (<7), blood pressure (<130/80), and cholesterol (LDL <100, if CVD <70).    Hyperlipidemia  -simvastatin increased to 40mg 9/2019  -repeat lipids today    Peripheral neuropathy  -controlled on gabapentin 100mg BID, rx by [facility]      There are no Patient Instructions on file for this visit.    Follow up: Return in about 3 months (around 4/6/2020).    Discussed the nature of the disease including, risks, complications, implications, management, safe and proper use of medications. Encouraged therapeutic lifestyle changes including low calorie diet with plenty of fruits and vegetables,  daily exercise, medication compliance, and keeping scheduled follow up appointments with me and any other providers. Encouraged patient to have appointment for complete physical, fasting labs, appropriate screenings, and immunizations on an annual basis.    Emily Hanson PA-C

## 2020-01-07 LAB
ALBUMIN UR-MCNC: <1.2 MG/DL
CREAT UR-MCNC: 37.5 MG/DL
MICROALBUMIN/CREAT UR: NORMAL MG/G{CREAT}

## 2020-01-13 DIAGNOSIS — I10 ESSENTIAL HYPERTENSION: ICD-10-CM

## 2020-01-13 RX ORDER — AMLODIPINE BESYLATE 5 MG/1
5 TABLET ORAL DAILY
Qty: 30 TABLET | Refills: 0 | Status: SHIPPED | OUTPATIENT
Start: 2020-01-13 | End: 2020-04-06

## 2020-03-05 ENCOUNTER — TELEPHONE (OUTPATIENT)
Dept: FAMILY MEDICINE CLINIC | Facility: CLINIC | Age: 55
End: 2020-03-05

## 2020-03-05 NOTE — TELEPHONE ENCOUNTER
----- Message from Gena WILKES Mancilla sent at 3/2/2020  3:07 PM EST -----  I dont see anything posting. But I think the patient should call billing. I cant see everything they have access to. [phone]  ----- Message -----  From: Bella Mansfield  Sent: 3/2/2020   2:30 PM EST  To: Gena WILKES Charo    Can you see if there was a payment post to her account?    ----- Message -----  From: Janell Bashir MA  Sent: 3/2/2020   9:17 AM EST  To: Bella Mansfield    What is this?  ----- Message -----  From: Connie Haley  Sent: 3/2/2020   8:45 AM EST  To: [facility], #    Have you all received any payments from [employer]. Called [employer] at [phone] spoke with May who stated check # [id number] was sent out to providers address on 11/25/2019. Check was received back on 12-24-19 and sent back out to providers office on 12/26/2019. This claim was paid in the amount of $62.50.

## 2020-03-05 NOTE — TELEPHONE ENCOUNTER
After several attempts, I have been unable to reach the patient. I left a voicemail to advise we did not see any payments posted and to please contact our billing dept. Provided their number.   Advised to call back with any additional questions.

## 2020-04-05 DIAGNOSIS — I10 ESSENTIAL HYPERTENSION: ICD-10-CM

## 2020-04-06 RX ORDER — AMLODIPINE BESYLATE 5 MG/1
5 TABLET ORAL DAILY
Qty: 90 TABLET | Refills: 0 | Status: SHIPPED | OUTPATIENT
Start: 2020-04-06 | End: 2020-08-31

## 2020-05-02 DIAGNOSIS — R10.84 GENERALIZED ABDOMINAL PAIN: ICD-10-CM

## 2020-05-04 RX ORDER — PANTOPRAZOLE SODIUM 40 MG/1
TABLET, DELAYED RELEASE ORAL
Qty: 30 TABLET | Refills: 0 | Status: SHIPPED | OUTPATIENT
Start: 2020-05-04 | End: 2020-08-31

## 2020-06-02 DIAGNOSIS — F32.9 REACTIVE DEPRESSION: ICD-10-CM

## 2020-06-04 RX ORDER — CITALOPRAM 40 MG/1
40 TABLET ORAL DAILY
Qty: 90 TABLET | Refills: 0 | Status: SHIPPED | OUTPATIENT
Start: 2020-06-04

## 2020-07-24 ENCOUNTER — TELEPHONE (OUTPATIENT)
Dept: FAMILY MEDICINE CLINIC | Facility: CLINIC | Age: 55
End: 2020-07-24

## 2020-07-31 RX ORDER — DAPAGLIFLOZIN AND METFORMIN HYDROCHLORIDE 2.5; 1 MG/1; MG/1
2 TABLET, FILM COATED, EXTENDED RELEASE ORAL DAILY
Qty: 60 TABLET | Refills: 5 | Status: SHIPPED | OUTPATIENT
Start: 2020-07-31

## 2020-07-31 NOTE — TELEPHONE ENCOUNTER
RX change request from Marcia Michelle required for Synjardy  Changed to Xigduo 2.5-100 2 tabs daily  RX E_ Scripted

## 2020-08-11 ENCOUNTER — TELEPHONE (OUTPATIENT)
Dept: ENDOCRINOLOGY | Facility: CLINIC | Age: 55
End: 2020-08-11

## 2020-08-11 NOTE — TELEPHONE ENCOUNTER
Received fax today stating that Januvia was covered under patient insurance company. ([employer])    Effective: 08/05/2020 until 08/05/2021  Reference #: [id number]

## 2020-08-30 DIAGNOSIS — E78.2 MIXED HYPERLIPIDEMIA: ICD-10-CM

## 2020-08-30 DIAGNOSIS — R10.84 GENERALIZED ABDOMINAL PAIN: ICD-10-CM

## 2020-08-30 DIAGNOSIS — I10 ESSENTIAL HYPERTENSION: ICD-10-CM

## 2020-08-31 RX ORDER — PANTOPRAZOLE SODIUM 40 MG/1
TABLET, DELAYED RELEASE ORAL
Qty: 30 TABLET | Refills: 0 | Status: SHIPPED | OUTPATIENT
Start: 2020-08-31

## 2020-08-31 RX ORDER — AMLODIPINE BESYLATE 5 MG/1
5 TABLET ORAL DAILY
Qty: 90 TABLET | Refills: 0 | Status: SHIPPED | OUTPATIENT
Start: 2020-08-31

## 2020-08-31 RX ORDER — SIMVASTATIN 40 MG
40 TABLET ORAL NIGHTLY
Qty: 90 TABLET | Refills: 1 | Status: SHIPPED | OUTPATIENT
Start: 2020-08-31

## 2020-08-31 RX ORDER — CHLORTHALIDONE 25 MG/1
25 TABLET ORAL DAILY
Qty: 90 TABLET | Refills: 3 | Status: SHIPPED | OUTPATIENT
Start: 2020-08-31

## 2020-11-17 NOTE — TELEPHONE ENCOUNTER
Caller: Isa Vogel    Relationship to patient: Self    Best call back number: [phone]     Chief complaint: FATIGUED, HEADACHE, KIDNEY'S HURT.  UPON SCREENING THE PATIENT SHE INFORMED MYSELF THAT SHE HAS HAD CONTACT WITH SOMEONE WHO TESTED POSITIVE FOR COVID19. THE PERSON GOT TESTED AGAIN THE VERY NEXT DAY AND HER TEST DID COME BACK NEGATIVE. IT HAS BEEN 6 DAYS SINCE THIS INCIDENT. THE PATIENT HAD A TEST DONE AND HER TEST DID COME BACK NEGATIVE. DIDN'T WANT TO SCHEDULE PATIENT AS A PRECAUTIONARY MEASURE WITHOUT PROVIDERS APPROVAL DUE TO THE FIRST TEST OF PATIENT'S FRIEND BEING POSITIVE.     Type of visit: OFFICE     Requested date: ASAP    If rescheduling, when is the original appointment:     Additional notes:        
I called pt and informed her Dr. Llamas has been on vacation and someone would reach out to her next week if Dr. Llamas was okay with still seeing her. Pt stated she would bring her negative test result when she had an appt set back up   
She can go to [facility] to get UA checked and to be seen with known positive exposure.    
68

## 2024-07-10 ENCOUNTER — APPOINTMENT (OUTPATIENT)
Facility: HOSPITAL | Age: 59
End: 2024-07-10
Payer: COMMERCIAL

## 2024-07-10 ENCOUNTER — APPOINTMENT (OUTPATIENT)
Dept: MRI IMAGING | Facility: HOSPITAL | Age: 59
End: 2024-07-10
Payer: COMMERCIAL

## 2024-07-10 ENCOUNTER — HOSPITAL ENCOUNTER (OUTPATIENT)
Facility: HOSPITAL | Age: 59
Setting detail: OBSERVATION
Discharge: HOME | End: 2024-07-11
Attending: STUDENT IN AN ORGANIZED HEALTH CARE EDUCATION/TRAINING PROGRAM | Admitting: INTERNAL MEDICINE
Payer: COMMERCIAL

## 2024-07-10 DIAGNOSIS — R73.9 HYPERGLYCEMIA: ICD-10-CM

## 2024-07-10 DIAGNOSIS — R20.0 FACIAL NUMBNESS: ICD-10-CM

## 2024-07-10 DIAGNOSIS — R29.810 FACIAL WEAKNESS: Primary | ICD-10-CM

## 2024-07-10 DIAGNOSIS — E11.65 UNCONTROLLED TYPE 2 DIABETES MELLITUS WITH HYPERGLYCEMIA: ICD-10-CM

## 2024-07-10 PROBLEM — G45.9 TRANSIENT ISCHEMIC ATTACK (TIA): Status: ACTIVE | Noted: 2024-07-10

## 2024-07-10 LAB
ALBUMIN SERPL-MCNC: 3.8 G/DL (ref 3.5–5.2)
ALBUMIN/GLOB SERPL: 1.5 G/DL
ALP SERPL-CCNC: 90 U/L (ref 39–117)
ALT SERPL W P-5'-P-CCNC: 35 U/L (ref 1–33)
ALT SERPL W P-5'-P-CCNC: 35 U/L (ref 1–33)
ANION GAP SERPL CALCULATED.3IONS-SCNC: 11.8 MMOL/L (ref 5–15)
APTT PPP: 24.6 SECONDS (ref 22–39)
AST SERPL-CCNC: 32 U/L (ref 1–32)
AST SERPL-CCNC: 33 U/L (ref 1–32)
BASOPHILS # BLD AUTO: 0.06 10*3/MM3 (ref 0–0.2)
BASOPHILS NFR BLD AUTO: 0.6 % (ref 0–1.5)
BILIRUB SERPL-MCNC: 0.6 MG/DL (ref 0–1.2)
BUN SERPL-MCNC: 14 MG/DL (ref 6–20)
BUN/CREAT SERPL: 14 (ref 7–25)
CALCIUM SPEC-SCNC: 9.2 MG/DL (ref 8.6–10.5)
CHLORIDE SERPL-SCNC: 98 MMOL/L (ref 98–107)
CO2 SERPL-SCNC: 23.2 MMOL/L (ref 22–29)
CREAT SERPL-MCNC: 1 MG/DL (ref 0.57–1)
DEPRECATED RDW RBC AUTO: 40.6 FL (ref 37–54)
EGFRCR SERPLBLD CKD-EPI 2021: 65.8 ML/MIN/1.73
EOSINOPHIL # BLD AUTO: 0.11 10*3/MM3 (ref 0–0.4)
EOSINOPHIL NFR BLD AUTO: 1.2 % (ref 0.3–6.2)
ERYTHROCYTE [DISTWIDTH] IN BLOOD BY AUTOMATED COUNT: 11.8 % (ref 12.3–15.4)
GLOBULIN UR ELPH-MCNC: 2.6 GM/DL
GLUCOSE BLDC GLUCOMTR-MCNC: 294 MG/DL (ref 70–130)
GLUCOSE BLDC GLUCOMTR-MCNC: 307 MG/DL (ref 70–130)
GLUCOSE BLDC GLUCOMTR-MCNC: 329 MG/DL (ref 70–130)
GLUCOSE BLDC GLUCOMTR-MCNC: 494 MG/DL (ref 70–130)
GLUCOSE SERPL-MCNC: 511 MG/DL (ref 65–99)
HCT VFR BLD AUTO: 42.3 % (ref 34–46.6)
HGB BLD-MCNC: 14.5 G/DL (ref 12–15.9)
HOLD SPECIMEN: NORMAL
IMM GRANULOCYTES # BLD AUTO: 0.01 10*3/MM3 (ref 0–0.05)
IMM GRANULOCYTES NFR BLD AUTO: 0.1 % (ref 0–0.5)
LYMPHOCYTES # BLD AUTO: 2.71 10*3/MM3 (ref 0.7–3.1)
LYMPHOCYTES NFR BLD AUTO: 29.3 % (ref 19.6–45.3)
MCH RBC QN AUTO: 31.7 PG (ref 26.6–33)
MCHC RBC AUTO-ENTMCNC: 34.3 G/DL (ref 31.5–35.7)
MCV RBC AUTO: 92.4 FL (ref 79–97)
MONOCYTES # BLD AUTO: 0.56 10*3/MM3 (ref 0.1–0.9)
MONOCYTES NFR BLD AUTO: 6 % (ref 5–12)
NEUTROPHILS NFR BLD AUTO: 5.81 10*3/MM3 (ref 1.7–7)
NEUTROPHILS NFR BLD AUTO: 62.8 % (ref 42.7–76)
NT-PROBNP SERPL-MCNC: 551 PG/ML (ref 0–900)
PLATELET # BLD AUTO: 267 10*3/MM3 (ref 140–450)
PMV BLD AUTO: 10.8 FL (ref 6–12)
POTASSIUM SERPL-SCNC: 4.2 MMOL/L (ref 3.5–5.2)
PROT SERPL-MCNC: 6.4 G/DL (ref 6–8.5)
RBC # BLD AUTO: 4.58 10*6/MM3 (ref 3.77–5.28)
SODIUM SERPL-SCNC: 133 MMOL/L (ref 136–145)
TROPONIN T SERPL HS-MCNC: 21 NG/L
WBC NRBC COR # BLD AUTO: 9.26 10*3/MM3 (ref 3.4–10.8)
WHOLE BLOOD HOLD COAG: NORMAL
WHOLE BLOOD HOLD SPECIMEN: NORMAL

## 2024-07-10 PROCEDURE — 70498 CT ANGIOGRAPHY NECK: CPT

## 2024-07-10 PROCEDURE — 25810000003 SODIUM CHLORIDE 0.9 % SOLUTION: Performed by: STUDENT IN AN ORGANIZED HEALTH CARE EDUCATION/TRAINING PROGRAM

## 2024-07-10 PROCEDURE — 92610 EVALUATE SWALLOWING FUNCTION: CPT

## 2024-07-10 PROCEDURE — G0378 HOSPITAL OBSERVATION PER HR: HCPCS

## 2024-07-10 PROCEDURE — 85025 COMPLETE CBC W/AUTO DIFF WBC: CPT | Performed by: STUDENT IN AN ORGANIZED HEALTH CARE EDUCATION/TRAINING PROGRAM

## 2024-07-10 PROCEDURE — 82948 REAGENT STRIP/BLOOD GLUCOSE: CPT

## 2024-07-10 PROCEDURE — 63710000001 INSULIN GLARGINE PER 5 UNITS: Performed by: NURSE PRACTITIONER

## 2024-07-10 PROCEDURE — 71045 X-RAY EXAM CHEST 1 VIEW: CPT

## 2024-07-10 PROCEDURE — 80053 COMPREHEN METABOLIC PANEL: CPT | Performed by: STUDENT IN AN ORGANIZED HEALTH CARE EDUCATION/TRAINING PROGRAM

## 2024-07-10 PROCEDURE — 84484 ASSAY OF TROPONIN QUANT: CPT | Performed by: STUDENT IN AN ORGANIZED HEALTH CARE EDUCATION/TRAINING PROGRAM

## 2024-07-10 PROCEDURE — 99291 CRITICAL CARE FIRST HOUR: CPT

## 2024-07-10 PROCEDURE — 85730 THROMBOPLASTIN TIME PARTIAL: CPT | Performed by: STUDENT IN AN ORGANIZED HEALTH CARE EDUCATION/TRAINING PROGRAM

## 2024-07-10 PROCEDURE — 70551 MRI BRAIN STEM W/O DYE: CPT

## 2024-07-10 PROCEDURE — 36415 COLL VENOUS BLD VENIPUNCTURE: CPT

## 2024-07-10 PROCEDURE — 83880 ASSAY OF NATRIURETIC PEPTIDE: CPT | Performed by: STUDENT IN AN ORGANIZED HEALTH CARE EDUCATION/TRAINING PROGRAM

## 2024-07-10 PROCEDURE — 70496 CT ANGIOGRAPHY HEAD: CPT

## 2024-07-10 PROCEDURE — 70450 CT HEAD/BRAIN W/O DYE: CPT

## 2024-07-10 PROCEDURE — 93005 ELECTROCARDIOGRAM TRACING: CPT | Performed by: STUDENT IN AN ORGANIZED HEALTH CARE EDUCATION/TRAINING PROGRAM

## 2024-07-10 PROCEDURE — 63710000001 INSULIN LISPRO (HUMAN) PER 5 UNITS: Performed by: NURSE PRACTITIONER

## 2024-07-10 PROCEDURE — 99222 1ST HOSP IP/OBS MODERATE 55: CPT | Performed by: INTERNAL MEDICINE

## 2024-07-10 PROCEDURE — 99204 OFFICE O/P NEW MOD 45 MIN: CPT | Performed by: INTERNAL MEDICINE

## 2024-07-10 PROCEDURE — 25510000001 IOPAMIDOL PER 1 ML: Performed by: STUDENT IN AN ORGANIZED HEALTH CARE EDUCATION/TRAINING PROGRAM

## 2024-07-10 RX ORDER — NICOTINE POLACRILEX 4 MG
15 LOZENGE BUCCAL
Status: DISCONTINUED | OUTPATIENT
Start: 2024-07-10 | End: 2024-07-11 | Stop reason: HOSPADM

## 2024-07-10 RX ORDER — SODIUM CHLORIDE 0.9 % (FLUSH) 0.9 %
10 SYRINGE (ML) INJECTION AS NEEDED
Status: DISCONTINUED | OUTPATIENT
Start: 2024-07-10 | End: 2024-07-11 | Stop reason: HOSPADM

## 2024-07-10 RX ORDER — PANTOPRAZOLE SODIUM 40 MG/1
40 TABLET, DELAYED RELEASE ORAL DAILY
Status: DISCONTINUED | OUTPATIENT
Start: 2024-07-11 | End: 2024-07-11 | Stop reason: HOSPADM

## 2024-07-10 RX ORDER — ASPIRIN 81 MG/1
81 TABLET, CHEWABLE ORAL DAILY
Status: DISCONTINUED | OUTPATIENT
Start: 2024-07-11 | End: 2024-07-11

## 2024-07-10 RX ORDER — AMLODIPINE BESYLATE 5 MG/1
5 TABLET ORAL DAILY
Status: DISCONTINUED | OUTPATIENT
Start: 2024-07-10 | End: 2024-07-11 | Stop reason: HOSPADM

## 2024-07-10 RX ORDER — CETIRIZINE HYDROCHLORIDE 5 MG/1
5 TABLET ORAL DAILY PRN
COMMUNITY

## 2024-07-10 RX ORDER — AMOXICILLIN 250 MG
2 CAPSULE ORAL 2 TIMES DAILY PRN
Status: DISCONTINUED | OUTPATIENT
Start: 2024-07-10 | End: 2024-07-11 | Stop reason: HOSPADM

## 2024-07-10 RX ORDER — POLYETHYLENE GLYCOL 3350 17 G/17G
17 POWDER, FOR SOLUTION ORAL DAILY PRN
Status: DISCONTINUED | OUTPATIENT
Start: 2024-07-10 | End: 2024-07-11 | Stop reason: HOSPADM

## 2024-07-10 RX ORDER — SODIUM CHLORIDE 0.9 % (FLUSH) 0.9 %
10 SYRINGE (ML) INJECTION EVERY 12 HOURS SCHEDULED
Status: DISCONTINUED | OUTPATIENT
Start: 2024-07-10 | End: 2024-07-11 | Stop reason: HOSPADM

## 2024-07-10 RX ORDER — BISACODYL 5 MG/1
5 TABLET, DELAYED RELEASE ORAL DAILY PRN
Status: DISCONTINUED | OUTPATIENT
Start: 2024-07-10 | End: 2024-07-11 | Stop reason: HOSPADM

## 2024-07-10 RX ORDER — ACETAMINOPHEN 325 MG/1
650 TABLET ORAL EVERY 4 HOURS PRN
Status: DISCONTINUED | OUTPATIENT
Start: 2024-07-10 | End: 2024-07-11 | Stop reason: HOSPADM

## 2024-07-10 RX ORDER — CETIRIZINE HYDROCHLORIDE 10 MG/1
5 TABLET ORAL DAILY PRN
Status: DISCONTINUED | OUTPATIENT
Start: 2024-07-10 | End: 2024-07-11 | Stop reason: HOSPADM

## 2024-07-10 RX ORDER — CHLORTHALIDONE 25 MG/1
25 TABLET ORAL DAILY
Status: DISCONTINUED | OUTPATIENT
Start: 2024-07-10 | End: 2024-07-11 | Stop reason: HOSPADM

## 2024-07-10 RX ORDER — INSULIN LISPRO 100 [IU]/ML
2-7 INJECTION, SOLUTION INTRAVENOUS; SUBCUTANEOUS
Status: DISCONTINUED | OUTPATIENT
Start: 2024-07-10 | End: 2024-07-11 | Stop reason: HOSPADM

## 2024-07-10 RX ORDER — BISACODYL 10 MG
10 SUPPOSITORY, RECTAL RECTAL DAILY PRN
Status: DISCONTINUED | OUTPATIENT
Start: 2024-07-10 | End: 2024-07-11 | Stop reason: HOSPADM

## 2024-07-10 RX ORDER — ASPIRIN 325 MG
325 TABLET ORAL ONCE
Status: COMPLETED | OUTPATIENT
Start: 2024-07-10 | End: 2024-07-10

## 2024-07-10 RX ORDER — SODIUM CHLORIDE 9 MG/ML
40 INJECTION, SOLUTION INTRAVENOUS AS NEEDED
Status: DISCONTINUED | OUTPATIENT
Start: 2024-07-10 | End: 2024-07-11 | Stop reason: HOSPADM

## 2024-07-10 RX ORDER — IBUPROFEN 600 MG/1
1 TABLET ORAL
Status: DISCONTINUED | OUTPATIENT
Start: 2024-07-10 | End: 2024-07-11 | Stop reason: HOSPADM

## 2024-07-10 RX ORDER — ONDANSETRON 4 MG/1
4 TABLET, ORALLY DISINTEGRATING ORAL EVERY 6 HOURS PRN
Status: DISCONTINUED | OUTPATIENT
Start: 2024-07-10 | End: 2024-07-11 | Stop reason: HOSPADM

## 2024-07-10 RX ORDER — ASPIRIN 81 MG/1
162 TABLET, CHEWABLE ORAL ONCE
Status: DISCONTINUED | OUTPATIENT
Start: 2024-07-10 | End: 2024-07-10

## 2024-07-10 RX ORDER — CITALOPRAM 20 MG/1
40 TABLET ORAL DAILY
Status: DISCONTINUED | OUTPATIENT
Start: 2024-07-11 | End: 2024-07-11 | Stop reason: HOSPADM

## 2024-07-10 RX ORDER — ACETAMINOPHEN 650 MG/1
650 SUPPOSITORY RECTAL EVERY 4 HOURS PRN
Status: DISCONTINUED | OUTPATIENT
Start: 2024-07-10 | End: 2024-07-11 | Stop reason: HOSPADM

## 2024-07-10 RX ORDER — ALBUTEROL SULFATE 2.5 MG/3ML
2.5 SOLUTION RESPIRATORY (INHALATION) EVERY 4 HOURS PRN
Status: DISCONTINUED | OUTPATIENT
Start: 2024-07-10 | End: 2024-07-11 | Stop reason: HOSPADM

## 2024-07-10 RX ORDER — ACETAMINOPHEN 160 MG/5ML
650 SOLUTION ORAL EVERY 4 HOURS PRN
Status: DISCONTINUED | OUTPATIENT
Start: 2024-07-10 | End: 2024-07-11 | Stop reason: HOSPADM

## 2024-07-10 RX ORDER — DEXTROSE MONOHYDRATE 25 G/50ML
25 INJECTION, SOLUTION INTRAVENOUS
Status: DISCONTINUED | OUTPATIENT
Start: 2024-07-10 | End: 2024-07-11 | Stop reason: HOSPADM

## 2024-07-10 RX ORDER — ONDANSETRON 2 MG/ML
4 INJECTION INTRAMUSCULAR; INTRAVENOUS EVERY 6 HOURS PRN
Status: DISCONTINUED | OUTPATIENT
Start: 2024-07-10 | End: 2024-07-11 | Stop reason: HOSPADM

## 2024-07-10 RX ORDER — GABAPENTIN 300 MG/1
300 CAPSULE ORAL 2 TIMES DAILY
Status: DISCONTINUED | OUTPATIENT
Start: 2024-07-10 | End: 2024-07-11 | Stop reason: HOSPADM

## 2024-07-10 RX ORDER — ASPIRIN 300 MG/1
300 SUPPOSITORY RECTAL DAILY
Status: DISCONTINUED | OUTPATIENT
Start: 2024-07-11 | End: 2024-07-11

## 2024-07-10 RX ORDER — ATORVASTATIN CALCIUM 40 MG/1
40 TABLET, FILM COATED ORAL NIGHTLY
Status: DISCONTINUED | OUTPATIENT
Start: 2024-07-10 | End: 2024-07-11 | Stop reason: HOSPADM

## 2024-07-10 RX ORDER — ATORVASTATIN CALCIUM 40 MG/1
40 TABLET, FILM COATED ORAL DAILY
COMMUNITY

## 2024-07-10 RX ADMIN — INSULIN LISPRO 4 UNITS: 100 INJECTION, SOLUTION INTRAVENOUS; SUBCUTANEOUS at 21:22

## 2024-07-10 RX ADMIN — IOPAMIDOL 85 ML: 755 INJECTION, SOLUTION INTRAVENOUS at 14:10

## 2024-07-10 RX ADMIN — Medication 10 ML: at 21:35

## 2024-07-10 RX ADMIN — Medication 10 ML: at 21:40

## 2024-07-10 RX ADMIN — SODIUM CHLORIDE 1000 ML: 9 INJECTION, SOLUTION INTRAVENOUS at 14:57

## 2024-07-10 RX ADMIN — INSULIN LISPRO 5 UNITS: 100 INJECTION, SOLUTION INTRAVENOUS; SUBCUTANEOUS at 17:40

## 2024-07-10 RX ADMIN — ATORVASTATIN CALCIUM 40 MG: 40 TABLET, FILM COATED ORAL at 21:22

## 2024-07-10 RX ADMIN — INSULIN GLARGINE 30 UNITS: 100 INJECTION, SOLUTION SUBCUTANEOUS at 21:22

## 2024-07-10 RX ADMIN — ASPIRIN 325 MG: 325 TABLET ORAL at 17:40

## 2024-07-10 RX ADMIN — GABAPENTIN 300 MG: 300 CAPSULE ORAL at 21:22

## 2024-07-10 NOTE — H&P
[facility] Medicine Services  HISTORY AND PHYSICAL    Patient Name: Isa Vogel  : 1965  MRN: [id number]  Primary Care Physician: Anders Solis MD  Date of admission: 7/10/2024      Subjective   Subjective     Chief Complaint:  Left face numbness and left arm tingling     HPI:  Isa Vogel is a 58 y.o. female with PMH of DM, CASSY, MI, AFib s/p ablation, HTN, HLD. Patient was at work today and around 1245pm she noticed left facial numbness and left arm tingling down to her hand. She states she felt like her face was drawing up and she did notice that it was asymmetrical at that time. She went to [facility] and was transferred her for further evaluation. Stroke team has already seen her. She does have a history of [place] Palsy and last flare was 10 years ago. She states she usually has left sided ear pain  with the palsy and she denies that today. She was recently taken off Metformin and her Bg have been more elevated at home. She denies any soa, cp, fever, chills, or N/V/D. She does have decreased sensation in left arm and left side of face. She states her family feels like her speech is still slightly dysarthric but she doesn't notice any changes right now.     OSH labs:  troponin 21, creat 1.00, glucose 511, WBC 9.26, AST 33, ALT 35,       Personal History     Past Medical History:   Diagnosis Date    Allergic     Asthma     Mccarthy's palsy     COPD (chronic obstructive pulmonary disease)     Diabetes mellitus     Diverticulosis     Essential hypertension 8/3/2016    GERD (gastroesophageal reflux disease)     Heart disease     Kidney disease     Obesity     Psoriasis     Visual impairment            Past Surgical History:   Procedure Laterality Date    ADENOIDECTOMY      CARDIAC ABLATION      HYSTERECTOMY      OOPHORECTOMY      TONSILLECTOMY      TUBAL ABDOMINAL LIGATION         Family History: family history includes Diabetes in her mother; Hypertension in her father  and mother; No Known Problems in her maternal grandfather, maternal grandmother, paternal grandfather, and paternal grandmother; Ovarian cancer in her sister.     Social History:  reports that she has never smoked. She has never used smokeless tobacco. She reports that she does not drink alcohol and does not use drugs.  Social History     Social History Narrative    PT IS  WITH 2 CHILDREN. 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       Medications:  Available home medication information reviewed.  Insulin Glargine, Insulin Pen Needle, Lancet Device, SITagliptin, Unable to find, albuterol, amLODIPine, atorvastatin, cetirizine, chlorthalidone, citalopram, fluocinolone acetonide, gabapentin, glucose blood, guanFACINE, ondansetron, and pantoprazole    Allergies   Allergen Reactions    Lisinopril     Tramadol        Objective   Objective     Vital Signs:   Temp:  [98.1 °F (36.7 °C)-98.2 °F (36.8 °C)] 98.2 °F (36.8 °C)  Heart Rate:  [60-71] 65  Resp:  [14-18] 16  BP: (111-142)/(77-88) 138/78  Total (NIH Stroke Scale): 3    Physical Exam   Constitutional: Awake, alert  Eyes: PERRLA, sclerae anicteric, no conjunctival injection  HENT: NCAT, mucous membranes moist  Neck: Supple, no thyromegaly, no lymphadenopathy, trachea midline  Respiratory: Clear to auscultation bilaterally, nonlabored respirations room air 97%  Cardiovascular: RRR, no murmurs, rubs, or gallops, palpable pedal pulses bilaterally  Gastrointestinal: Positive bowel sounds, soft, nontender, nondistended  Musculoskeletal: No bilateral ankle edema, no clubbing or cyanosis to extremities  Psychiatric: Appropriate affect, cooperative  Neurologic: Oriented x 3, strength symmetric in all extremities, , speech clear, decreased sensation in left arm, left side of face  Skin: No rashes      Result Review:  I have personally reviewed the results from the time of this admission to 7/10/2024 18:14 EDT and agree with these findings:  [x]  Laboratory list /  accordion  []  Microbiology  [x]  Radiology  []  EKG/Telemetry   []  Cardiology/Vascular   []  Pathology  []  Old records  []  Other:  Most notable findings include:       LAB RESULTS:      Lab 07/10/24  1404   WBC 9.26   HEMOGLOBIN 14.5   HEMATOCRIT 42.3   PLATELETS 267   NEUTROS ABS 5.81   IMMATURE GRANS (ABS) 0.01   LYMPHS ABS 2.71   MONOS ABS 0.56   EOS ABS 0.11   MCV 92.4   APTT 24.6         Lab 07/10/24  1404   SODIUM 133*   POTASSIUM 4.2   CHLORIDE 98   CO2 23.2   ANION GAP 11.8   BUN 14   CREATININE 1.00   EGFR 65.8   GLUCOSE 511*   CALCIUM 9.2         Lab 07/10/24  1404   TOTAL PROTEIN 6.4   ALBUMIN 3.8   GLOBULIN 2.6   ALT (SGPT) 35*  35*   AST (SGOT) 32  33*   BILIRUBIN 0.6   ALK PHOS 90         Lab 07/10/24  1404   PROBNP 551.0   HSTROP T 21*                     Microbiology Results (last 10 days)       ** No results found for the last 240 hours. **            XR Chest 1 View    Result Date: 7/10/2024  XR CHEST 1 VW Date of Exam: 7/10/2024 2:43 PM EDT Indication: Acute Stroke Protocol (onset < 12 hrs) Comparison: None available. Findings: The heart is prominent in size. No pneumothorax or pleural effusion. No focal infiltrate.     Impression: Impression: Mild cardiomegaly but no acute cardiopulmonary process. Electronically Signed: Oriana Bustamante MD  7/10/2024 3:05 PM EDT  Workstation ID: [id number]    CT Angiogram Head w AI Analysis of LVO    Result Date: 7/10/2024  CT ANGIOGRAM NECK, CT ANGIOGRAM HEAD W AI ANALYSIS OF LVO Date of Exam: 7/10/2024 1:56 PM EDT Indication: Neuro Deficit, acute, Stroke suspected Neuro deficit, acute stroke suspected. Comparison: Noncontrast head CT from today Technique: CTA of the neck was performed before and after the uneventful intravenous administration of 85 mL Isovue-370. Reconstructed coronal and sagittal images were also obtained. In addition, a 3-D volume rendered image was created for interpretation. Automated exposure control and iterative reconstruction  methods were used. FINDINGS: Vascular Findings: The right common carotid, internal carotid, middle cerebral, anterior cerebral, vertebral, and posterior cerebral arteries are patent without abrupt cut off or aneurysmal dilation. 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 The left common carotid, internal carotid, middle cerebral, anterior cerebral, vertebral, and posterior cerebral arteries are patent without abrupt cut off or aneurysmal dilation. Questionable focal moderate stenosis of an M2 branch of the left MCA (series 7, image 342). This appearance may be artifactual. Basilar artery appears patent and appears unremarkable. Non-vascular Findings: For description of nonvascular intracranial findings, please refer to the noncontrast head CT performed the same date. No acute abnormality is identified within the visualized soft tissue or bony structures of the neck. The visualized lung apices are clear.     Impression: 1.No acute abnormality identified within the large arteries of the head or neck. 2.No significant stenosis of the bilateral internal carotid arteries. 3.Questionable focal moderate stenosis of an M2 branch of the left MCA (series 7, image 342). This appearance may be artifactual. Electronically Signed: Abdi Blanchard MD  7/10/2024 2:23 PM EDT  Workstation ID: [id number]    CT Angiogram Neck    Result Date: 7/10/2024  CT ANGIOGRAM NECK, CT ANGIOGRAM HEAD W AI ANALYSIS OF LVO Date of Exam: 7/10/2024 1:56 PM EDT Indication: Neuro Deficit, acute, Stroke suspected Neuro deficit, acute stroke suspected. Comparison: Noncontrast head CT from today Technique: CTA of the neck was performed before and after the uneventful intravenous administration of 85 mL Isovue-370. Reconstructed coronal and sagittal images were also obtained. In addition, a 3-D volume rendered image was created for interpretation. Automated exposure control and iterative reconstruction methods were  used. FINDINGS: Vascular Findings: The right common carotid, internal carotid, middle cerebral, anterior cerebral, vertebral, and posterior cerebral arteries are patent without abrupt cut off or aneurysmal dilation. 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 The left common carotid, internal carotid, middle cerebral, anterior cerebral, vertebral, and posterior cerebral arteries are patent without abrupt cut off or aneurysmal dilation. Questionable focal moderate stenosis of an M2 branch of the left MCA (series 7, image 342). This appearance may be artifactual. Basilar artery appears patent and appears unremarkable. Non-vascular Findings: For description of nonvascular intracranial findings, please refer to the noncontrast head CT performed the same date. No acute abnormality is identified within the visualized soft tissue or bony structures of the neck. The visualized lung apices are clear.     Impression: 1.No acute abnormality identified within the large arteries of the head or neck. 2.No significant stenosis of the bilateral internal carotid arteries. 3.Questionable focal moderate stenosis of an M2 branch of the left MCA (series 7, image 342). This appearance may be artifactual. Electronically Signed: Abdi Blanchard MD  7/10/2024 2:23 PM EDT  Workstation ID: [id number]    CT Head Without Contrast Stroke Protocol    Result Date: 7/10/2024  CT HEAD WO CONTRAST STROKE PROTOCOL Date of Exam: 7/10/2024 1:50 PM EDT Indication: Neuro deficit, acute, stroke suspected Neuro Deficit, acute, Stroke suspected. Comparison: None available. Technique: Axial CT images were obtained of the head without contrast administration.  Reconstructed coronal images were also obtained. Automated exposure control and iterative construction methods were used. Scan Time: 1:52 p.m. Results discussed with ER personnel at 1:56 p.m.. Findings: Ventricles are normal in size and shape and are midline. There  is a prominent perivascular space or old insult of the right temporal lobe. There is no acute mass effect or edema. There is no acute CVA or hemorrhage. Paranasal sinuses are clear. Mastoid air cells are well aerated.     Impression: Impression: No acute process. Electronically Signed: Sharon Jay MD  7/10/2024 1:59 PM EDT  Workstation ID: [id number]     Results for orders placed in visit on 10/05/17    SCANNED - ECHOCARDIOGRAM      Assessment & Plan   Assessment & Plan       Diabetes mellitus    Essential hypertension    Mixed hyperlipidemia    Transient ischemic attack (TIA)    TIA/Stroke   -- stroke team following  -- CT head without contrast was negative for hemorrhage and/or acute process. -- CTA head/neck was negative for LVO; ? Asymptomatic left M2 stenosis per radiology read.   -- MRI brain pending  -- ECHO in am   -- FLP and A1c in am   -- consult PT.OT.CM  -- ASA and statin     DM  -- cont lantus 30 units qhs  -- hold oral meds  -- LDSSI     HTN  HLD  Hx of Afib   -- watch on tele  -- allow permissive HTN  -- statin    CASSY   -- does not use cpap at home  -- monitor on cont pulse ox tonight for any drops in saturation     VTE Prophylaxis:  Mechanical VTE prophylaxis orders are present.          CODE STATUS:    Code Status and Medical Interventions:   Ordered at: 07/10/24 1814     Medical Intervention Limits:    No intubation (DNI)     Level Of Support Discussed With:    Patient     Code Status (Patient has no pulse and is not breathing):    No CPR (Do Not Attempt to Resuscitate)     Medical Interventions (Patient has pulse or is breathing):    Limited Support       Expected Discharge   Expected Discharge Date: 7/12/2024; Expected Discharge Time:      Loli Shah, SHAYY  07/10/24        Attending   Admission Attestation       I have performed an independent face-to-face diagnostic evaluation including performing an independent physical examination.  I approve of the documented plan of care above that  was reviewed and developed with the advanced practice clinician (APC) and take responsibility for that plan along with its associated risks.  I have updated the HPI as appropriate.    Brief HPI  Isa Vogel is a 58 y.o. female with with past medical history of essential hypertension, dyslipidemia, type 2 diabetes, obesity, A-fib status post ablation few years ago, currently not on anticoagulation, obstructive sleep apnea who presented to the hospital with left facial and left upper extremity numbness.  Symptoms started around 12:45 PM while she is at work with sudden onset of left facial numbness, quickly progressed to involve her left upper extremity.  She went to [facility] and was seen by neuro telemetry and was transferred to [facility] for further evaluation.  Upon arrival to the hospital, her symptoms much improved.  She still have some numbness on the left side of her face but much improved from when it first started.  Left upper extremity numbness almost resolved.  No associated weakness, dysarthria, lightheadedness, or headache.  Seen by stroke neurology team    Attending Physical Exam:  Temp:  [98.1 °F (36.7 °C)-98.2 °F (36.8 °C)] 98.1 °F (36.7 °C)  Heart Rate:  [60-71] 61  Resp:  [14-18] 18  BP: (111-142)/(62-88) 118/62    General: Comfortable, not in distress, conversant and cooperative, obese  Head: Atraumatic and normocephalic  Eyes: No Icterus. No pallor  Ears:  Ears appear intact with no abnormalities noted  Throat: No oral lesions, no thrush  Neck: Supple, trachea midline  Lungs: Clear to auscultation bilaterally, equal air entry, no wheezing or crackles  Heart:  Normal S1 and S2, no murmur, no gallop, No JVD, no lower extremity swelling  Abdomen:  Soft, no tenderness, no organomegaly, normal bowel sounds, no organomegaly  Extremities: pulses equal bilaterally  Skin: No bleeding, bruising or rash, normal skin turgor and elasticity  Neurologic: Cranial nerves appear intact with no  evidence of facial asymmetry, normal motor and sensory functions in all 4 extremities  Psych: Alert and oriented x 3, normal mood    Result Review:  I have personally reviewed the results from the time of this admission to 7/11/2024 00:37 EDT and agree with these findings:  [x]  Laboratory list / accordion  [x]  Microbiology  []  Radiology  [x]  EKG/Telemetry   []  Cardiology/Vascular   []  Pathology  [x]  Old records    Assessment and Plan:    See assessment and plan documented by APC above and updated/edited by me as appropriate.    Tito Elias MD  07/11/24

## 2024-07-10 NOTE — THERAPY EVALUATION
Acute Care - Speech Language Pathology   Swallow Initial Evaluation [facility]  Clinical Swallow Evaluation      Patient Name: Isa Vogel  : 1965  MRN: [id number]  Today's Date: 7/10/2024               Admit Date: 7/10/2024    Visit Dx:     ICD-10-CM ICD-9-CM   1. Facial weakness  R29.810 781.94   2. Facial numbness  R20.0 782.0   3. Hyperglycemia  R73.9 790.29     Patient Active Problem List   Diagnosis    Diabetes mellitus    Type 2 diabetes mellitus without complication    Essential hypertension    Morbid obesity due to excess calories    Routine health maintenance    Mild intermittent asthma    Annual physical exam    Breast cancer screening    Postmenopausal    Mixed hyperlipidemia    Benign paroxysmal positional vertigo of left ear    Labyrinthitis of both ears    Vertigo    Lice infested hair    Generalized abdominal pain    Reactive depression    Dysfunction of left eustachian tube    Visual impairment    Kidney disease    Heart disease    GERD (gastroesophageal reflux disease)    Diverticulosis    COPD (chronic obstructive pulmonary disease)    Bell's palsy    Asthma    Acute midline low back pain without sciatica    Urine abnormality    Yeast vaginitis    Bronchitis    Fever    Bilateral otitis media with effusion    Diarrhea    Anxiety    Uncontrolled type 2 diabetes mellitus with diabetic neuropathy, without long-term current use of insulin    Diabetic peripheral neuropathy associated with type 2 diabetes mellitus     Past Medical History:   Diagnosis Date    Allergic     Asthma     Mccarthy's palsy     COPD (chronic obstructive pulmonary disease)     Diabetes mellitus     Diverticulosis     Essential hypertension 8/3/2016    GERD (gastroesophageal reflux disease)     Heart disease     Kidney disease     Obesity     Psoriasis     Visual impairment      Past Surgical History:   Procedure Laterality Date    ADENOIDECTOMY      CARDIAC ABLATION      HYSTERECTOMY      OOPHORECTOMY      TONSILLECTOMY       TUBAL ABDOMINAL LIGATION         SLP Recommendation and Plan  SLP Swallowing Diagnosis: swallow WFL/no suspected pharyngeal impairment (07/10/24 1620)  SLP Diet Recommendation: regular textures, thin liquids (07/10/24 1620)  Recommended Precautions and Strategies: general aspiration precautions (07/10/24 1620)  SLP Rec. for Method of Medication Administration: meds whole, with thin liquids, meds crushed, with puree, as tolerated (07/10/24 1620)     Monitor for Signs of Aspiration: yes, notify SLP if any concerns (07/10/24 1620)  Recommended Diagnostics: No further SLP services recommended (07/10/24 1620)  Swallow Criteria for Skilled Therapeutic Interventions Met: no problems identified which require skilled intervention (07/10/24 1620)  Anticipated Discharge Disposition (SLP): unknown (07/10/24 1620)     Therapy Frequency (Swallow): evaluation only (07/10/24 1620)     Oral Care Recommendations: Oral Care BID/PRN, Toothbrush (07/10/24 1620)  Demonstrates Need for Referral to Another Service: speech therapy, other (see comments) ([facility] [place] (07/10/24 1620)                                            SWALLOW EVALUATION (Last 72 Hours)       SLP Adult Swallow Evaluation       Row Name 07/10/24 1620                   Rehab Evaluation    Document Type evaluation  -        Subjective Information no complaints  -        Patient Observations alert;cooperative  -        Patient/Family/Caregiver Comments/Observations family present  -        Patient Effort good  -        Symptoms Noted During/After Treatment none  -           General Information    Patient Profile Reviewed yes  -MH        Pertinent History Of Current Problem Adm w/ transient L facial droop/numbness. Hx: CAD, HTN, sleep apnea, CKD, GERD, a-fib, Bell's palsy. Head CT negative for acute intracranial abnormalities  -        Current Method of Nutrition NPO  -        Precautions/Limitations, Vision WFL;for purposes of eval  -         Precautions/Limitations, Hearing WFL;for purposes of eval  -        Prior Level of Function-Communication unknown  -        Prior Level of Function-Swallowing no diet consistency restrictions  -        Plans/Goals Discussed with patient;family;agreed upon  [facility]        Barriers to Rehab none identified  -        Patient's Goals for Discharge patient did not state  -        Family Goals for Discharge family did not state  -           Pain    Additional Documentation Pain Scale: FACES Pre/Post-Treatment (Group)  -           Pain Scale: FACES Pre/Post-Treatment    Pain: FACES Scale, Pretreatment 0-->no hurt  -        Posttreatment Pain Rating 0-->no hurt  -           Oral Motor Structure and Function    Dentition Assessment natural, present and adequate  -        Secretion Management WNL/WFL  -        Mucosal Quality moist, healthy  -           Oral Musculature and Cranial Nerve Assessment    Oral Motor General Assessment [facility]           General Eating/Swallowing Observations    Respiratory Support Currently in Use room air  -        Eating/Swallowing Skills self-fed  -        Positioning During Eating upright in bed  -        Utensils Used spoon;cup;straw  -        Consistencies Trialed ice chips;thin liquids;pureed;regular textures  -           Clinical Swallow Eval    Pharyngeal Phase no overt signs/symptoms of pharyngeal impairment  -        Clinical Swallow Evaluation Summary No overt s/s of aspiration across trials of thin liquid, pureed, or regular solid consistencies; even when pushed for consecutive sips of thin. Adequate oral manipulation/mastication of regular solid trial and no significant residue. Ok to initiate regular diet w/ thin liquids, general aspiration precautions. No acute needs @ this time, SLP will sign off for dysphagia & f/u for [facility]LC eval  -           SLP Evaluation Clinical Impression    SLP Swallowing Diagnosis swallow WFL/no suspected pharyngeal  impairment  -        Swallow Criteria for Skilled Therapeutic Interventions Met no problems identified which require skilled intervention  -           Recommendations    Therapy Frequency (Swallow) evaluation only  -        SLP Diet Recommendation regular textures;thin liquids  -        Recommended Diagnostics No further SLP services recommended  -        Recommended Precautions and Strategies general aspiration precautions  -        Oral Care Recommendations Oral Care BID/PRN;Toothbrush  -        SLP Rec. for Method of Medication Administration meds whole;with thin liquids;meds crushed;with puree;as tolerated  -        Monitor for Signs of Aspiration yes;notify SLP if any concerns  -        Anticipated Discharge Disposition (SLP) unknown  -        Demonstrates Need for Referral to Another Service speech therapy;other (see comments)  [facility] Eval  -                  User Key  (r) = Recorded By, (t) = Taken By, (c) = Cosigned By      Initials Name Effective Dates     Cora Lyons MS CCC-SLP 05/12/23 -                     EDUCATION  The patient has been educated in the following areas:   Dysphagia (Swallowing Impairment).                Time Calculation:    Time Calculation- SLP       Row Name 07/10/24 1708             Time Calculation- [facility]    SLP Start Time 1620  -      SLP Received On 07/10/24  -         Untimed Charges    33166-[id number] Eval Oral Pharyng Swallow Minutes 40  -         Total Minutes    Untimed Charges Total Minutes 40  -       Total Minutes 40  -                User Key  (r) = Recorded By, (t) = Taken By, (c) = Cosigned By      Initials Name Provider Type     Cora Lyons MS CCC-SLP Speech and Language Pathologist                    Therapy Charges for Today       Code Description Service Date Service Provider Modifiers Qty    [id number] HC ST EVAL ORAL PHARYNG SWALLOW 3 7/10/2024 Cora Lyons MS CCC-SLP GN 1                 Cora Lyons MS  CCC-SLP  7/10/2024

## 2024-07-10 NOTE — CONSULTS
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 Symptoms have resolved by the time I saw her in the CT scan room.  Blood glucose was 500s.    Stroke Risk Factors/ Pertinent Data     Stroke risk factors: coronary artery disease, hypertension, sleep apnea, CAD, CKD, DM, GERD, ablation x 2 for A.fib, peripheral neuropathy.  Anticoagulants prior to arrival: none  Antiplatelets prior to arrival: none  Statins prior to arrival: none     Scoring Scales     Modified Candler Scale  Pre-Stroke Modified Candler Scale: 0 - No Symptoms at all.  Intracerebral Hemmorhage (ICH) Score  Strang Coma Score: 13-15  Age>=80: no  Mariela Coma Scale  Best Eye Response: Spontaneous  Best Verbal Response: Oriented  Best Motor Response: Follows commands  Strang Coma Scale Score: 15    NIH Stroke Scale     NIHSS Performed Date: 07/10/24     Interval: baseline  1a. Level of Consciousness: 0-->Alert, keenly responsive  1b. LOC Questions: 0-->Answers both questions correctly  1c. LOC Commands: 0-->Performs both tasks correctly  2. Best Gaze: 0-->Normal  3. Visual: 0-->No visual loss  4. Facial Palsy: 0-->Normal symmetrical movements  5a. Motor Arm, Left: 0-->No drift, limb holds 90 (or 45) degrees for full 10 secs  5b. Motor Arm, Right: 0-->No drift, limb holds 90 (or 45) degrees for full 10 secs  6a. Motor Leg, Left: 0-->No drift, leg holds 30 degree position for full 5 secs  6b. Motor Leg, Right: 0-->No drift, leg holds 30 degree position for full 5 secs  7. Limb Ataxia: 0-->Absent  8. Sensory: 0-->Normal, no sensory loss  9. Best Language:  0-->No aphasia, normal  10. Dysarthria: 0-->Normal  11. Extinction and Inattention (formerly Neglect): 0-->No abnormality  Total (NIH Stroke Scale): 0     Review of Systems     Review of Systems  10+ systems were reviewed.  In addition to what is listed in the HPI, the following was positive:     Constitutional: No fevers or chills.  Psychiatric: No depression/anxiety. Any stressful events  Skin: No rashes.  Respiratory: No shortness of breath.  Cardiovascular: No chest pain.  GI: No nausea/vomiting.  : No incontinence.  Endocrine: No polydipsia.  Musculoskeletal: No muscle pain/joint pain.  Neurologic: as per HPI and otherwise negative  Hematologic: No excessive bruising.    Objective   Exam     Exam performed with the help of support staff from the referring site  Neurological Exam  General: Alert, cooperative, no distress, appears stated age  Head: normocephalic, without obvious abnormalities, atraumatic  Eyes: conjunctivae/corneas clear  Throat: lips, mucosa, and tongue normal  Lungs: non labored breathing  Extremities: No edema, no cyanosis, no deformities  psych: judgement and insight is appropriate, see neuro exam for mental status.      Neurological Examination:    Mental status: fully alert; fully oriented; normal language fluency, comprehension. No dysarthria was appreciated; No evidence of visuospatial or tactile neglect;  Cranial nerves:  visual fields were full to confrontation; pupils were equal and reactive to light; versions were full without nystagmus; facial sensation was full; eye closure symmetric and smile was symmetric; handles own secretions, shoulder shrug was symmetric; tongue protruded midline.  Motor:  no pronator drift; power was grossly full throughout  Sensory:  pinprick and light touch full and symmetric;  Coordination:  no ataxia with finger to nose or heel to shin testing. No involuntary movements were observed.  Result Review    Results          Personal review of CNS  imaging:(Official report by radiologist pending)  Imaging  CT Imaging Review: CT Imaging reviewed, NO acute infarct/ hemorrhage seen    Thrombolytic   Thrombolytics: thrombolytic not given  Thrombolytic Relative Exclusions for All Patients: Only minor non-disabling symptoms     Assessment & Plan   Assessment/ Plan     Assessment:  Acute Stroke Evaluation: Suspected ACUTE ischemic stroke   Hyperglycemia    Hyperglycemia vs TIA    Plan:    Vitals and monitoring:    - High frequency vital signs and neurochecks -  q4 hours x 24 hours    - Continuous cardiac monitoring and telemetry    Blood pressure management:    -Permissive hypertension, treat BP if SBP > 180 or DBP > 110 then lower BP by 15% within the first 24 hrs.    Imaging:  -MRI brain without contrast if able; if not then repeat non-contrast head CT in 48-72 hours would be reasonable to assess for evolving ischemic infarct  -PRN head CT without contrast for neurologic decline    Vascular study:  -CTA head/neck pending.    Cardiac ECHO:  -Transthoracic Echocardiogram (TTE) with PFO assessment    Labs:  -Fasting Lipid panel for LDL  -HgbA1C    Antithrombotic:  -ASA 81 mg for now. Possible anticoagulation decision after the MRI.    Statin:  -Start Lipitor 40 mg    Therapy:  -PT/OT/ST evaluation and  -Ensure bedside dysphagia screen is completed  -Assess for IP rehab needs    Glucose level < 180.  Stroke clinic follow up.       Disposition     Disposition: The patient will remain at the referring institution for further evaluation and management    Medical Decision Making  Medical Data Reviewed: Data reviewed including: clinical labs, radiology and/or medical tests, Obtaining case history from another source, Obtaining/ reviewing old medical records, Independent review of CNS images  Length of visit: 35 minutes    Hilario BUNCH MD, saw the patient on 07/10/24 at   for an initial in-patient or emergency room telememedicine face to face consult using  interactive technology for 35 minutes. The location of the patient was  . I was located at [place].    I have proceeded with this evaluation at the request of the referring practitioner as it is felt to be an emergency setting and no appropriate specialist is available to perform this evaluation. The [facility] has reported that this is the correct patient and has obtained consent from the patient/surrogate to perform this telemedicine evaluation(including obtaining history, performing examination and reviewing data provided by the patient an/or originating site of care provider)    I have introduced myself to the patient, provided my credentials, disclosed my location, and determined that, based on review of the patient's chart and discussion with the patient's primary team, telemedicine via a HIPAA compliant, real-time, face-to-face two-way, interactive audio and video platform is an appropriate and effective means of providing the service.    The patient/surrogate has a right to refuse this evaluation as they have been explained risks including potential loss of confidentiality, benefits, alternatives, and the potential need for subsequent face-to-face care. In this evaluation, we will be providing recommendations only.  The ultimate decision to follow or not to follow these recommendations will be left to the bedside treating/requesting practitioner.    The patient/surrogate has been notified that other healthcare professionals including technical person may be involved in this A/V evaluation.  All laws concerning confidentiality and patient access to medical records and copies of medical records apply to telemedicine.  The patient/surrogate has received the originating [facility]'s Health Notice of Privacy Practices.    Hilario Spain MD

## 2024-07-10 NOTE — PLAN OF CARE
Goal Outcome Evaluation:                     Anticipated Discharge Disposition (SLP): unknown          SLP Swallowing Diagnosis: swallow WFL/no suspected pharyngeal impairment (07/10/24 4174)

## 2024-07-10 NOTE — [facility] PROVIDER NOTE
"Subjective  History of Present Illness:    58 year old female hx of DM, [place] palsy who presents with left sided weakness/numbness x 45 minutes. She states her facial weakness symptoms had resolved after she had bells in the past.       Nurses Notes reviewed and agree, including vitals, allergies, social history and prior medical history.     REVIEW OF SYSTEMS: All systems reviewed and not pertinent unless noted.  Review of Systems   All other systems reviewed and are negative.      Past Medical History:   Diagnosis Date    Allergic     Asthma     Mccarthy's palsy     COPD (chronic obstructive pulmonary disease)     Diabetes mellitus     Diverticulosis     Essential hypertension 8/3/2016    GERD (gastroesophageal reflux disease)     Heart disease     Kidney disease     Obesity     Psoriasis     Visual impairment        Allergies:    Lisinopril and Tramadol      Past Surgical History:   Procedure Laterality Date    ADENOIDECTOMY      CARDIAC ABLATION      HYSTERECTOMY      OOPHORECTOMY      TONSILLECTOMY      TUBAL ABDOMINAL LIGATION           Social History     Socioeconomic History    Marital status:    Tobacco Use    Smoking status: Never    Smokeless tobacco: Never   Substance and Sexual Activity    Alcohol use: No    Drug use: No    Sexual activity: Never         Family History   Problem Relation Age of Onset    Hypertension Mother     Diabetes Mother     Hypertension Father     No Known Problems Maternal Grandmother     No Known Problems Maternal Grandfather     No Known Problems Paternal Grandmother     No Known Problems Paternal Grandfather     Ovarian cancer Sister        Objective  Physical Exam:  /88   Pulse 63   Temp 98.2 °F (36.8 °C) (Oral)   Resp 18   Ht 162.6 cm (64\")   Wt 124 kg (272 lb 7.8 oz)   SpO2 95%   BMI 46.77 kg/m²      Physical Exam  Constitutional:       Appearance: Normal appearance.   HENT:      Head: Normocephalic and atraumatic.      Nose: Nose normal.      Mouth/Throat: "      Mouth: Mucous membranes are moist.   Eyes:      Extraocular Movements: Extraocular movements intact.      Conjunctiva/sclera: Conjunctivae normal.   Cardiovascular:      Rate and Rhythm: Normal rate and regular rhythm.   Pulmonary:      Effort: Pulmonary effort is normal. No respiratory distress.   Abdominal:      General: There is no distension.      Comments: Atraumatic    Musculoskeletal:         General: Normal range of motion.      Cervical back: Normal range of motion and neck supple.   Skin:     General: Skin is warm and dry.   Neurological:      Mental Status: She is alert.      Comments: NIHSS 2 (left sided facial asymetry-depressed eyebrow, facial droop), numbness to left side of face and LUE   Psychiatric:         Mood and Affect: Mood normal.         Behavior: Behavior normal.         Critical Care    Performed by: Sandip Reis MD  Authorized by: Sandip Reis MD    Critical care provider statement:     Critical care time (minutes):  31    Critical care time was exclusive of:  Separately billable procedures and treating other patients    Critical care was necessary to treat or prevent imminent or life-threatening deterioration of the following conditions:  CNS failure or compromise, sepsis, metabolic crisis, dehydration and renal failure    Critical care was time spent personally by me on the following activities:  Ordering and performing treatments and interventions, ordering and review of laboratory studies, ordering and review of radiographic studies, re-evaluation of patient's condition, review of old charts, obtaining history from patient or surrogate, examination of patient, evaluation of patient's response to treatment, discussions with consultants and development of treatment plan with patient or surrogate      ED Course:         Lab Results (last 24 hours)       Procedure Component Value Units Date/Time    POC Glucose Once [[id number]]  (Abnormal) Collected: 07/10/24 8474     Specimen: Blood Updated: 07/10/24 1404     Glucose 494 mg/dL      Comment: Serial Number: [id number]:  [id number]       CBC & Differential [[id number]]  (Abnormal) Collected: 07/10/24 1404    Specimen: Blood Updated: 07/10/24 1408    Narrative:      The following orders were created for panel order CBC & Differential.  Procedure                               Abnormality         Status                     ---------                               -----------         ------                     CBC Auto Differential[[id number]]        Abnormal            Final result                 Please view results for these tests on the individual orders.    Single High Sensitivity Troponin T [[id number]]  (Abnormal) Collected: 07/10/24 1404    Specimen: Blood Updated: 07/10/24 1424     HS Troponin T 21 ng/L     aPTT [[id number]]  (Normal) Collected: 07/10/24 1404    Specimen: Blood Updated: 07/10/24 1419     PTT 24.6 seconds     Narrative:      PTT = The equivalent PTT values for the therapeutic range of heparin levels at 0.3 to 0.5 U/ml are 60 to 70 seconds.    AST [[id number]]  (Abnormal) Collected: 07/10/24 1404    Specimen: Blood Updated: 07/10/24 1425     AST (SGOT) 33 U/L     ALT [[id number]]  (Abnormal) Collected: 07/10/24 1404    Specimen: Blood Updated: 07/10/24 1425     ALT (SGPT) 35 U/L     CBC Auto Differential [[id number]]  (Abnormal) Collected: 07/10/24 1404    Specimen: Blood Updated: 07/10/24 1408     WBC 9.26 10*3/mm3      RBC 4.58 10*6/mm3      Hemoglobin 14.5 g/dL      Hematocrit 42.3 %      MCV 92.4 fL      MCH 31.7 pg      MCHC 34.3 g/dL      RDW 11.8 %      RDW-SD 40.6 fl      MPV 10.8 fL      Platelets 267 10*3/mm3      Neutrophil % 62.8 %      Lymphocyte % 29.3 %      Monocyte % 6.0 %      Eosinophil % 1.2 %      Basophil % 0.6 %      Immature Grans % 0.1 %      Neutrophils, Absolute 5.81 10*3/mm3      Lymphocytes, Absolute 2.71 10*3/mm3      Monocytes, Absolute 0.56 10*3/mm3      Eosinophils, Absolute 0.11  10*3/mm3      Basophils, Absolute 0.06 10*3/mm3      Immature Grans, Absolute 0.01 10*3/mm3     Comprehensive Metabolic Panel [[id number]] Collected: 07/10/24 1404    Specimen: Blood Updated: 07/10/24 1437    BNP [[id number]] Collected: 07/10/24 1404    Specimen: Blood Updated: 07/10/24 1437             CT Angiogram Head w AI Analysis of LVO    Result Date: 7/10/2024  CT ANGIOGRAM NECK, CT ANGIOGRAM HEAD W AI ANALYSIS OF LVO Date of Exam: 7/10/2024 1:56 PM EDT Indication: Neuro Deficit, acute, Stroke suspected Neuro deficit, acute stroke suspected. Comparison: Noncontrast head CT from today Technique: CTA of the neck was performed before and after the uneventful intravenous administration of 85 mL Isovue-370. Reconstructed coronal and sagittal images were also obtained. In addition, a 3-D volume rendered image was created for interpretation. Automated exposure control and iterative reconstruction methods were used. FINDINGS: Vascular Findings: The right common carotid, internal carotid, middle cerebral, anterior cerebral, vertebral, and posterior cerebral arteries are patent without abrupt cut off or aneurysmal dilation. 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 The left common carotid, internal carotid, middle cerebral, anterior cerebral, vertebral, and posterior cerebral arteries are patent without abrupt cut off or aneurysmal dilation. Questionable focal moderate stenosis of an M2 branch of the left MCA (series 7, image 342). This appearance may be artifactual. Basilar artery appears patent and appears unremarkable. Non-vascular Findings: For description of nonvascular intracranial findings, please refer to the noncontrast head CT performed the same date. No acute abnormality is identified within the visualized soft tissue or bony structures of the neck. The visualized lung apices are clear.     Impression: 1.No acute abnormality identified within the large arteries of the  head or neck. 2.No significant stenosis of the bilateral internal carotid arteries. 3.Questionable focal moderate stenosis of an M2 branch of the left MCA (series 7, image 342). This appearance may be artifactual. Electronically Signed: Abdi Blanchard MD  7/10/2024 2:23 PM EDT  Workstation ID: [id number]    CT Angiogram Neck    Result Date: 7/10/2024  CT ANGIOGRAM NECK, CT ANGIOGRAM HEAD W AI ANALYSIS OF LVO Date of Exam: 7/10/2024 1:56 PM EDT Indication: Neuro Deficit, acute, Stroke suspected Neuro deficit, acute stroke suspected. Comparison: Noncontrast head CT from today Technique: CTA of the neck was performed before and after the uneventful intravenous administration of 85 mL Isovue-370. Reconstructed coronal and sagittal images were also obtained. In addition, a 3-D volume rendered image was created for interpretation. Automated exposure control and iterative reconstruction methods were used. FINDINGS: Vascular Findings: The right common carotid, internal carotid, middle cerebral, anterior cerebral, vertebral, and posterior cerebral arteries are patent without abrupt cut off or aneurysmal dilation. 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 The left common carotid, internal carotid, middle cerebral, anterior cerebral, vertebral, and posterior cerebral arteries are patent without abrupt cut off or aneurysmal dilation. Questionable focal moderate stenosis of an M2 branch of the left MCA (series 7, image 342). This appearance may be artifactual. Basilar artery appears patent and appears unremarkable. Non-vascular Findings: For description of nonvascular intracranial findings, please refer to the noncontrast head CT performed the same date. No acute abnormality is identified within the visualized soft tissue or bony structures of the neck. The visualized lung apices are clear.     Impression: 1.No acute abnormality identified within the large arteries of the head or neck.  2.No significant stenosis of the bilateral internal carotid arteries. 3.Questionable focal moderate stenosis of an M2 branch of the left MCA (series 7, image 342). This appearance may be artifactual. Electronically Signed: Abdi Blanchard MD  7/10/2024 2:23 PM EDT  Workstation ID: [id number]    CT Head Without Contrast Stroke Protocol    Result Date: 7/10/2024  CT HEAD WO CONTRAST STROKE PROTOCOL Date of Exam: 7/10/2024 1:50 PM EDT Indication: Neuro deficit, acute, stroke suspected Neuro Deficit, acute, Stroke suspected. Comparison: None available. Technique: Axial CT images were obtained of the head without contrast administration.  Reconstructed coronal images were also obtained. Automated exposure control and iterative construction methods were used. Scan Time: 1:52 p.m. Results discussed with ER personnel at 1:56 p.m.. Findings: Ventricles are normal in size and shape and are midline. There is a prominent perivascular space or old insult of the right temporal lobe. There is no acute mass effect or edema. There is no acute CVA or hemorrhage. Paranasal sinuses are clear. Mastoid air cells are well aerated.     Impression: Impression: No acute process. Electronically Signed: Sharon Jay MD  7/10/2024 1:59 PM EDT  Workstation ID: [id number]        [facility]          DDX: includes but is not limited to: CVA/TIA, bells palsy, hyperglycemia, sepsis    Pertinent features from physical exam: left sided facial droop, numbness to left face/LUE.    Initial diagnostic plan: labs, ct head/neck imaging    Results from initial plan were reviewed and interpreted by me revealing patient hyperglycemia blood sugar ~500. Will start off with fluids until CMP returns   Patient presents within [facility] [place]. Dr. [place] has performed evaluation. No neuro deficits at time of his assessment. He recommends MRI/echo/admission to main    Diagnostic information from other sources: chart review    Interventions / Re-evaluation: aspirin    Medications    sodium chloride 0.9 % flush 10 mL (has no administration in time range)   sodium chloride 0.9 % bolus 1,000 mL (has no administration in time range)   aspirin chewable tablet 162 mg (has no administration in time range)   iopamidol (ISOVUE-370) 76 % injection 150 mL (85 mL Intravenous Given 7/10/24 1410)       Results/clinical rationale were discussed with patient/daughter     Consultations/Discussion of results with other physicians: Grabiel, neurology. Transfer center states we can place bed request in under [facility]    Data interpreted: Nursing notes reviewed, vital signs reviewed.  Labs independently interpreted by me .  Imaging independently interpreted by me.  EKG independently interpreted by me.      Counseling: Discussed the results above with the patient regarding need for admission or discharge.  Patient understands and agrees plan of care.      -----  ED Disposition       ED Disposition   Decision to Admit    Condition   --    Comment   Level of Care: Telemetry [5]   Accepting Provider:: OSMANI ALEMAN [[id number]]               Final diagnoses:   Facial weakness   Facial numbness   Hyperglycemia     Your Follow-Up Providers    Follow-up information has not been specified.       Contact information for after-discharge care    Follow-up information has not been specified.          Your medication list        CONTINUE taking these medications        Instructions Last Dose Given Next Dose Due   B-D UF III MINI PEN NEEDLES 31G X 5 MM misc  Generic drug: Insulin Pen Needle      USE AS DIRECTED TWICE DAILY       Lancet Device misc      1 each 2 (Two) Times a Day.              ASK your doctor about these medications        Instructions Last Dose Given Next Dose Due   albuterol (2.5 MG/3ML) 0.083% nebulizer solution  Commonly known as: PROVENTIL      Take 2.5 mg by nebulization every 4 (four) hours as needed for wheezing.       amLODIPine 5 MG tablet  Commonly known as: NORVASC      TAKE 1 TABLET BY MOUTH DAILY        chlorthalidone 25 MG tablet  Commonly known as: HYGROTON      TAKE 1 TABLET BY MOUTH DAILY       citalopram 40 MG tablet  Commonly known as: CeleXA      TAKE 1 TABLET BY MOUTH DAILY       DermOtic 0.01 % oil otic oil  Generic drug: fluocinolone acetonide      DermOtic Oil 0.01 % ear drops   INSTILL 4 DROPS INTO AFFECTED EAR(S) BY OTIC ROUTE ONCE DAILY X 10 DAYS, THEN PRN       gabapentin 100 MG capsule  Commonly known as: NEURONTIN      TK ONE C PO BID       glucose blood test strip  Commonly known as: Accu-Chek Dulce Plus      Check BS TID       guanFACINE 1 MG tablet  Commonly known as: Tenex      Take 1 tablet by mouth Daily As Needed (anxiety).       Insulin Glargine 100 UNIT/ML injection pen  Commonly known as: LANTUS SOLOSTAR      Inject 30 Units under the skin into the appropriate area as directed Every Night.       ondansetron 4 MG tablet  Commonly known as: Zofran      Take 1 tablet by mouth Every 8 (Eight) Hours As Needed for Nausea or Vomiting.       pantoprazole 40 MG EC tablet  Commonly known as: PROTONIX      TAKE 1 TABLET BY MOUTH DAILY       simvastatin 40 MG tablet  Commonly known as: ZOCOR      TAKE 1 TABLET BY MOUTH EVERY NIGHT       SITagliptin 100 MG tablet  Commonly known as: Januvia      Take 1 tablet by mouth Daily.       Unable to find      1 each 1 (One) Time. Med Name: red 2. Oral capsule       Xigduo XR 2.5-1000 MG tablet sustained-release 24 hour  Generic drug: Dapagliflozin Pro-metFORMIN ER      Take 2 tablets by mouth Daily.

## 2024-07-11 ENCOUNTER — READMISSION MANAGEMENT (OUTPATIENT)
Dept: CALL CENTER | Facility: HOSPITAL | Age: 59
End: 2024-07-11
Payer: COMMERCIAL

## 2024-07-11 ENCOUNTER — APPOINTMENT (OUTPATIENT)
Dept: CARDIOLOGY | Facility: HOSPITAL | Age: 59
End: 2024-07-11
Payer: COMMERCIAL

## 2024-07-11 VITALS
HEART RATE: 59 BPM | TEMPERATURE: 98 F | DIASTOLIC BLOOD PRESSURE: 77 MMHG | WEIGHT: 272.49 LBS | SYSTOLIC BLOOD PRESSURE: 126 MMHG | OXYGEN SATURATION: 97 % | RESPIRATION RATE: 16 BRPM | BODY MASS INDEX: 46.52 KG/M2 | HEIGHT: 64 IN

## 2024-07-11 LAB
ALBUMIN SERPL-MCNC: 3.4 G/DL (ref 3.5–5.2)
ALBUMIN/GLOB SERPL: 1.4 G/DL
ALP SERPL-CCNC: 87 U/L (ref 39–117)
ALT SERPL W P-5'-P-CCNC: 29 U/L (ref 1–33)
ANION GAP SERPL CALCULATED.3IONS-SCNC: 8 MMOL/L (ref 5–15)
AST SERPL-CCNC: 23 U/L (ref 1–32)
BASOPHILS # BLD AUTO: 0.07 10*3/MM3 (ref 0–0.2)
BASOPHILS NFR BLD AUTO: 0.9 % (ref 0–1.5)
BH CV ECHO MEAS - AO MAX PG: 9.7 MMHG
BH CV ECHO MEAS - AO MEAN PG: 5 MMHG
BH CV ECHO MEAS - AO ROOT DIAM: 3.6 CM
BH CV ECHO MEAS - AO V2 MAX: 156 CM/SEC
BH CV ECHO MEAS - AO V2 VTI: 37 CM
BH CV ECHO MEAS - AVA(I,D): 1.72 CM2
BH CV ECHO MEAS - EDV(CUBED): 128.8 ML
BH CV ECHO MEAS - EDV(MOD-SP2): 42.6 ML
BH CV ECHO MEAS - EDV(MOD-SP4): 164 ML
BH CV ECHO MEAS - EF(MOD-SP2): 53.1 %
BH CV ECHO MEAS - EF(MOD-SP4): 51.1 %
BH CV ECHO MEAS - ESV(CUBED): 32.8 ML
BH CV ECHO MEAS - ESV(MOD-SP2): 20 ML
BH CV ECHO MEAS - ESV(MOD-SP4): 80.2 ML
BH CV ECHO MEAS - FS: 36.6 %
BH CV ECHO MEAS - IVS/LVPW: 1.18 CM
BH CV ECHO MEAS - IVSD: 1.3 CM
BH CV ECHO MEAS - LA DIMENSION: 4.2 CM
BH CV ECHO MEAS - LAT PEAK E' VEL: 7 CM/SEC
BH CV ECHO MEAS - LV MASS(C)D: 237.5 GRAMS
BH CV ECHO MEAS - LV MAX PG: 4.2 MMHG
BH CV ECHO MEAS - LV MEAN PG: 2 MMHG
BH CV ECHO MEAS - LV V1 MAX: 103 CM/SEC
BH CV ECHO MEAS - LV V1 VTI: 20.2 CM
BH CV ECHO MEAS - LVIDD: 5.1 CM
BH CV ECHO MEAS - LVIDS: 3.2 CM
BH CV ECHO MEAS - LVOT AREA: 3.1 CM2
BH CV ECHO MEAS - LVOT DIAM: 2 CM
BH CV ECHO MEAS - LVPWD: 1.1 CM
BH CV ECHO MEAS - MED PEAK E' VEL: 6.2 CM/SEC
BH CV ECHO MEAS - MV A MAX VEL: 103 CM/SEC
BH CV ECHO MEAS - MV DEC SLOPE: 360 CM/SEC2
BH CV ECHO MEAS - MV DEC TIME: 0.25 SEC
BH CV ECHO MEAS - MV E MAX VEL: 88.8 CM/SEC
BH CV ECHO MEAS - MV E/A: 0.86
BH CV ECHO MEAS - MV MAX PG: 5 MMHG
BH CV ECHO MEAS - MV MEAN PG: 2 MMHG
BH CV ECHO MEAS - MV P1/2T: 85.4 MSEC
BH CV ECHO MEAS - MV V2 VTI: 40.9 CM
BH CV ECHO MEAS - MVA(P1/2T): 2.6 CM2
BH CV ECHO MEAS - MVA(VTI): 1.55 CM2
BH CV ECHO MEAS - SV(LVOT): 63.5 ML
BH CV ECHO MEAS - SV(MOD-SP2): 22.6 ML
BH CV ECHO MEAS - SV(MOD-SP4): 83.8 ML
BH CV ECHO MEAS - TAPSE (>1.6): 2.28 CM
BH CV ECHO MEASUREMENTS AVERAGE E/E' RATIO: 13.45
BH CV ECHO SHUNT ASSESSMENT PERFORMED (HIDDEN SCRIPTING): 1
BH CV XLRA - RV BASE: 3.3 CM
BH CV XLRA - RV LENGTH: 5.4 CM
BH CV XLRA - RV MID: 2.5 CM
BH CV XLRA - TDI S': 16.3 CM/SEC
BILIRUB SERPL-MCNC: 0.7 MG/DL (ref 0–1.2)
BUN SERPL-MCNC: 13 MG/DL (ref 6–20)
BUN/CREAT SERPL: 14.6 (ref 7–25)
CALCIUM SPEC-SCNC: 8.8 MG/DL (ref 8.6–10.5)
CHLORIDE SERPL-SCNC: 102 MMOL/L (ref 98–107)
CHOLEST SERPL-MCNC: 159 MG/DL (ref 0–200)
CO2 SERPL-SCNC: 28 MMOL/L (ref 22–29)
CREAT SERPL-MCNC: 0.89 MG/DL (ref 0.57–1)
DEPRECATED RDW RBC AUTO: 41.1 FL (ref 37–54)
EGFRCR SERPLBLD CKD-EPI 2021: 75.7 ML/MIN/1.73
EOSINOPHIL # BLD AUTO: 0.14 10*3/MM3 (ref 0–0.4)
EOSINOPHIL NFR BLD AUTO: 1.8 % (ref 0.3–6.2)
ERYTHROCYTE [DISTWIDTH] IN BLOOD BY AUTOMATED COUNT: 11.9 % (ref 12.3–15.4)
GLOBULIN UR ELPH-MCNC: 2.4 GM/DL
GLUCOSE BLDC GLUCOMTR-MCNC: 253 MG/DL (ref 70–130)
GLUCOSE BLDC GLUCOMTR-MCNC: 278 MG/DL (ref 70–130)
GLUCOSE SERPL-MCNC: 289 MG/DL (ref 65–99)
HBA1C MFR BLD: 11.6 % (ref 4.8–5.6)
HCT VFR BLD AUTO: 42.6 % (ref 34–46.6)
HDLC SERPL-MCNC: 40 MG/DL (ref 40–60)
HGB BLD-MCNC: 14.4 G/DL (ref 12–15.9)
IMM GRANULOCYTES # BLD AUTO: 0.01 10*3/MM3 (ref 0–0.05)
IMM GRANULOCYTES NFR BLD AUTO: 0.1 % (ref 0–0.5)
LDLC SERPL CALC-MCNC: 92 MG/DL (ref 0–100)
LDLC/HDLC SERPL: 2.19 {RATIO}
LYMPHOCYTES # BLD AUTO: 3.3 10*3/MM3 (ref 0.7–3.1)
LYMPHOCYTES NFR BLD AUTO: 42.6 % (ref 19.6–45.3)
MCH RBC QN AUTO: 31.8 PG (ref 26.6–33)
MCHC RBC AUTO-ENTMCNC: 33.8 G/DL (ref 31.5–35.7)
MCV RBC AUTO: 94 FL (ref 79–97)
MONOCYTES # BLD AUTO: 0.5 10*3/MM3 (ref 0.1–0.9)
MONOCYTES NFR BLD AUTO: 6.5 % (ref 5–12)
NEUTROPHILS NFR BLD AUTO: 3.72 10*3/MM3 (ref 1.7–7)
NEUTROPHILS NFR BLD AUTO: 48.1 % (ref 42.7–76)
NRBC BLD AUTO-RTO: 0 /100 WBC (ref 0–0.2)
PLATELET # BLD AUTO: 258 10*3/MM3 (ref 140–450)
PMV BLD AUTO: 10.8 FL (ref 6–12)
POTASSIUM SERPL-SCNC: 4.3 MMOL/L (ref 3.5–5.2)
PROT SERPL-MCNC: 5.8 G/DL (ref 6–8.5)
RBC # BLD AUTO: 4.53 10*6/MM3 (ref 3.77–5.28)
SODIUM SERPL-SCNC: 138 MMOL/L (ref 136–145)
TRIGL SERPL-MCNC: 157 MG/DL (ref 0–150)
VLDLC SERPL-MCNC: 27 MG/DL (ref 5–40)
WBC NRBC COR # BLD AUTO: 7.74 10*3/MM3 (ref 3.4–10.8)

## 2024-07-11 PROCEDURE — 93306 TTE W/DOPPLER COMPLETE: CPT

## 2024-07-11 PROCEDURE — 83036 HEMOGLOBIN GLYCOSYLATED A1C: CPT

## 2024-07-11 PROCEDURE — 80061 LIPID PANEL: CPT

## 2024-07-11 PROCEDURE — G0378 HOSPITAL OBSERVATION PER HR: HCPCS

## 2024-07-11 PROCEDURE — 80053 COMPREHEN METABOLIC PANEL: CPT | Performed by: NURSE PRACTITIONER

## 2024-07-11 PROCEDURE — 99214 OFFICE O/P EST MOD 30 MIN: CPT | Performed by: STUDENT IN AN ORGANIZED HEALTH CARE EDUCATION/TRAINING PROGRAM

## 2024-07-11 PROCEDURE — 99239 HOSP IP/OBS DSCHRG MGMT >30: CPT | Performed by: INTERNAL MEDICINE

## 2024-07-11 PROCEDURE — 97161 PT EVAL LOW COMPLEX 20 MIN: CPT

## 2024-07-11 PROCEDURE — 97165 OT EVAL LOW COMPLEX 30 MIN: CPT

## 2024-07-11 PROCEDURE — 93306 TTE W/DOPPLER COMPLETE: CPT | Performed by: INTERNAL MEDICINE

## 2024-07-11 PROCEDURE — 85025 COMPLETE CBC W/AUTO DIFF WBC: CPT | Performed by: NURSE PRACTITIONER

## 2024-07-11 PROCEDURE — 82948 REAGENT STRIP/BLOOD GLUCOSE: CPT

## 2024-07-11 PROCEDURE — 63710000001 INSULIN LISPRO (HUMAN) PER 5 UNITS: Performed by: NURSE PRACTITIONER

## 2024-07-11 PROCEDURE — 92523 SPEECH SOUND LANG COMPREHEN: CPT

## 2024-07-11 RX ORDER — VALACYCLOVIR HYDROCHLORIDE 500 MG/1
1000 TABLET, FILM COATED ORAL 3 TIMES DAILY
Status: DISCONTINUED | OUTPATIENT
Start: 2024-07-11 | End: 2024-07-11 | Stop reason: HOSPADM

## 2024-07-11 RX ORDER — DAPAGLIFLOZIN 5 MG/1
10 TABLET, FILM COATED ORAL DAILY
Qty: 30 TABLET | Refills: 0 | Status: SHIPPED | OUTPATIENT
Start: 2024-07-11

## 2024-07-11 RX ORDER — VALACYCLOVIR HYDROCHLORIDE 1 G/1
1000 TABLET, FILM COATED ORAL 3 TIMES DAILY
Qty: 21 TABLET | Refills: 0 | Status: SHIPPED | OUTPATIENT
Start: 2024-07-11 | End: 2024-07-18

## 2024-07-11 RX ORDER — AMOXICILLIN AND CLAVULANATE POTASSIUM 875; 125 MG/1; MG/1
1 TABLET, FILM COATED ORAL 2 TIMES DAILY
Qty: 20 TABLET | Refills: 0 | Status: SHIPPED | OUTPATIENT
Start: 2024-07-11

## 2024-07-11 RX ADMIN — ASPIRIN 81 MG: 81 TABLET, CHEWABLE ORAL at 08:20

## 2024-07-11 RX ADMIN — GABAPENTIN 300 MG: 300 CAPSULE ORAL at 08:21

## 2024-07-11 RX ADMIN — INSULIN LISPRO 4 UNITS: 100 INJECTION, SOLUTION INTRAVENOUS; SUBCUTANEOUS at 08:21

## 2024-07-11 RX ADMIN — PANTOPRAZOLE SODIUM 40 MG: 40 TABLET, DELAYED RELEASE ORAL at 08:20

## 2024-07-11 RX ADMIN — ACETAMINOPHEN 650 MG: 325 TABLET ORAL at 08:20

## 2024-07-11 RX ADMIN — CITALOPRAM HYDROBROMIDE 40 MG: 20 TABLET ORAL at 08:20

## 2024-07-11 NOTE — THERAPY DISCHARGE NOTE
Patient Name: Isa Vogel  : 1965    MRN: [id number]                              Today's Date: 2024       Admit Date: 7/10/2024    Visit Dx:     ICD-10-CM ICD-9-CM   1. Facial weakness  R29.810 781.94   2. Facial numbness  R20.0 782.0   3. Hyperglycemia  R73.9 790.29   4. Uncontrolled type 2 diabetes mellitus with hyperglycemia  E11.65 250.02     Patient Active Problem List   Diagnosis    Diabetes mellitus    Type 2 diabetes mellitus without complication    Essential hypertension    Morbid obesity due to excess calories    Routine health maintenance    Mild intermittent asthma    Annual physical exam    Breast cancer screening    Postmenopausal    Mixed hyperlipidemia    Benign paroxysmal positional vertigo of left ear    Labyrinthitis of both ears    Vertigo    Lice infested hair    Generalized abdominal pain    Reactive depression    Dysfunction of left eustachian tube    Visual impairment    Kidney disease    Heart disease    GERD (gastroesophageal reflux disease)    Diverticulosis    COPD (chronic obstructive pulmonary disease)    Bell's palsy    Asthma    Acute midline low back pain without sciatica    Urine abnormality    Yeast vaginitis    Bronchitis    Fever    Bilateral otitis media with effusion    Diarrhea    Anxiety    Uncontrolled type 2 diabetes mellitus with diabetic neuropathy, without long-term current use of insulin    Diabetic peripheral neuropathy associated with type 2 diabetes mellitus    Transient ischemic attack (TIA)     Past Medical History:   Diagnosis Date    Allergic     Asthma     Mccarthy's palsy     COPD (chronic obstructive pulmonary disease)     Diabetes mellitus     Diverticulosis     Essential hypertension 8/3/2016    GERD (gastroesophageal reflux disease)     Heart disease     Kidney disease     Obesity     Psoriasis     Visual impairment      Past Surgical History:   Procedure Laterality Date    ADENOIDECTOMY      CARDIAC ABLATION      HYSTERECTOMY      OOPHORECTOMY       TONSILLECTOMY      TUBAL ABDOMINAL LIGATION        General Information       Row Name 07/11/24 1047          Physical Therapy Time and Intention    Document Type discharge evaluation/summary  -CD     Mode of Treatment physical therapy  -CD       Row Name 07/11/24 1047          General Information    Patient Profile Reviewed yes  -CD     Prior Level of Function independent:;all household mobility;community mobility;gait;transfer;bed mobility;ADL's;work  PT IS A NURSE AT [employer].  -CD     Existing Precautions/Restrictions no known precautions/restrictions  INTERMITTIENT L UE PARASTHESIA  -CD     Barriers to Rehab medically complex  -CD       Row Name 07/11/24 1047          Living Environment    People in Home spouse  -CD       Row Name 07/11/24 1047          Home Main Entrance    Number of Stairs, Main Entrance two  -CD       Row Name 07/11/24 1047          Stairs Within Home, Primary    Number of Stairs, Within Home, Primary none  -CD       Row Name 07/11/24 1047          Cognition    Orientation Status (Cognition) oriented x 4  -CD       Row Name 07/11/24 1047          Safety Issues, Functional Mobility    Comment, Safety Issues/Impairments (Mobility) NO SAFETY CONCERNS.  -CD               User Key  (r) = Recorded By, (t) = Taken By, (c) = Cosigned By      Initials Name Provider Type    CD Magaly Ramires, PT Physical Therapist                   Mobility       Row Name 07/11/24 1055          Bed Mobility    Comment, (Bed Mobility) PT [facility]ON ARRIVAL.  -CD       Row Name 07/11/24 1055          Transfers    Comment, (Transfers) DENIED DIZZINESS WITH SIT TO STAND. NO LOB.  -CD       Row Name 07/11/24 1055          Sit-Stand Transfer    Sit-Stand [place] (Transfers) independent  -CD       Row Name 07/11/24 1055          Gait/Stairs (Locomotion)    [place] Level (Gait) independent  -CD     Distance in Feet (Gait) 400  -CD     Comment, (Gait/Stairs) NO GAIT DEFICITS. ABLE TO SCAN R/L TO READ  SIGNS IN LAM AND WALK BACKWARDS WITHOUT LOB OR DIFFICULTY. DENIED DIZZINESS.  -CD               User Key  (r) = Recorded By, (t) = Taken By, (c) = Cosigned By      Initials Name Provider Type    CD Magaly Ramires, PT Physical Therapist                   Obj/Interventions       Row Name 07/11/24 1056          Range of Motion Comprehensive    General Range of Motion no range of motion deficits identified  -CD       Row Name 07/11/24 1056          Strength Comprehensive (MMT)    General Manual Muscle Testing (MMT) Assessment no strength deficits identified  -CD     Comment, General Manual Muscle Testing (MMT) Assessment B LE GROSSLY 5/5.  -CD       [facility] Name 07/11/24 1056          Motor Skills    Motor Skills functional endurance  -CD     Functional Endurance O2 SATS STABLE ON RA.  -CD       [facility] Name 07/11/24 1056          Balance    Balance Assessment sitting static balance  -CD     Static Sitting Balance independent  -CD     Dynamic Sitting Balance independent  -CD     Position, Sitting Balance unsupported  -CD     Static Standing Balance independent  -CD     Dynamic Standing Balance independent  -CD     Position/Device Used, Standing Balance supported  -CD     Comment, Balance SEE GAIT NOTE.  -CD       [facility] Name 07/11/24 1056          Sensory Assessment (Somatosensory)    Sensory Assessment (Somatosensory) bilateral LE  -CD     Bilateral LE Sensory Assessment light touch awareness;intact  -CD               User Key  (r) = Recorded By, (t) = Taken By, (c) = Cosigned By      Initials Name Provider Type    CD Magaly Ramires, PT Physical Therapist                   Goals/Plan    No documentation.                  Clinical Impression       Row Name 07/11/24 1057          Pain    Pretreatment Pain Rating 0/10 - no pain  -CD     Posttreatment Pain Rating 0/10 - no pain  -CD       Row Name 07/11/24 1057          Plan of Care Review    Plan of Care Reviewed With patient  -CD     Outcome Evaluation GOALS NOT ESTABLISHED AS PT  IS AT BASELINE WITH FUNCTIONAL MOBILITY. NO MOTOR, BALANCE, COORDINATION OR SENSATION DEFICITS EXCEPT MILD INTERMITTENT L UE PARESTHESIA. PT AMBULATED AND COMPLETED DYNAMIC BALANCE ACTIVITY WITHOUT LOB OR DIFFICULTY. DENIES PAIN OR DIZZINESS. PT IS A&O AND FOLLOWS ALL COMMANDS. D/C P.T. RECOMMEND HOME AT D/C.  -CD       Row Name 07/11/24 1057          Therapy Assessment/Plan (PT)    Patient/Family Therapy Goals Statement (PT) TO GO HOME.  -CD     Criteria for Skilled Interventions Met (PT) no;no problems identified which require skilled intervention  -CD     Therapy Frequency (PT) evaluation only  -CD       Row Name 07/11/24 1057          Vital Signs    Pre Systolic BP Rehab 130  -CD     Pre Treatment Diastolic BP 90  -CD     Post Systolic BP Rehab 144  -CD     Post Treatment Diastolic BP 81  -CD     Posttreatment Heart Rate (beats/min) 53  -CD     Pre SpO2 (%) 97  -CD     O2 Delivery Pre Treatment room air  -CD     O2 Delivery Intra Treatment room air  -CD     Post SpO2 (%) 97  -CD     O2 Delivery Post Treatment room air  -CD     Pre Patient Position Sitting  -CD     Intra Patient Position Standing  -CD     Post Patient Position Sitting  -CD       Row Name 07/11/24 1057          Positioning and Restraints    Pre-Treatment Position sitting in chair/recliner  -CD     Post Treatment Position chair  -CD     In Chair sitting;with family/caregiver  PT IS UP AD ALONSO IN ROOM.  -CD               User Key  (r) = Recorded By, (t) = Taken By, (c) = Cosigned By      Initials Name Provider Type    CD Magaly Ramires, PT Physical Therapist                   Outcome Measures       Row Name 07/11/24 1101          How much help from another person do you currently need...    Turning from your back to your side while in flat bed without using bedrails? 4  -CD     Moving from lying on back to sitting on the side of a flat bed without bedrails? 4  -CD     Moving to and from a bed to a chair (including a wheelchair)? 4  -CD     Standing  up from a chair using your arms (e.g., wheelchair, bedside chair)? 4  -CD     Climbing 3-5 steps with a railing? 4  -CD     To walk in hospital room? 4  -CD     AM-PAC 6 Clicks Score (PT) 24  -CD     Highest Level of Mobility Goal 8 --> Walked 250 feet or more  -CD       Row Name 07/11/24 1101 07/11/24 1019       Modified [place] Scale    Modified [place] Scale 1 - No significant disability despite symptoms.  Able to carry out all usual duties and activities.  -CD 1 - No significant disability despite symptoms.  Able to carry out all usual duties and activities.  -      Row Name 07/11/24 1019          Functional Assessment    Outcome Measure Options AM-PAC 6 Clicks Daily Activity (OT);Modified [place]  -CS               User Key  (r) = Recorded By, (t) = Taken By, (c) = Cosigned By      Initials Name Provider Type    CD Magaly Ramires, PT Physical Therapist    CS Alejandro Eli, OT Occupational Therapist                  Physical Therapy Education       Title: PT OT SLP Therapies (Done)       Topic: Physical Therapy (Done)       Point: Precautions (Done)       Learning Progress Summary             Patient Acceptance, E, VU by CD at 7/11/2024 1102    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                                         User Key       Initials Effective Dates Name Provider Type Discipline     02/03/23 -  Magaly Ramires, PT Physical Therapist PT                  PT Recommendation and Plan     Plan of Care Reviewed With: patient  Outcome Evaluation: GOALS NOT ESTABLISHED AS PT IS AT BASELINE WITH FUNCTIONAL MOBILITY. NO MOTOR, BALANCE, COORDINATION OR SENSATION DEFICITS EXCEPT MILD INTERMITTENT L UE PARESTHESIA. PT AMBULATED AND COMPLETED DYNAMIC BALANCE ACTIVITY WITHOUT LOB OR DIFFICULTY. DENIES PAIN OR DIZZINESS. PT IS A&O AND FOLLOWS ALL COMMANDS. D/C P.T. RECOMMEND HOME AT D/C.     Time Calculation:   PT Evaluation Complexity  History, PT Evaluation Complexity: 3 or more personal  factors and/or comorbidities  Examination of Body Systems (PT Eval Complexity): total of 4 or more elements  Clinical Presentation (PT Evaluation Complexity): stable  Clinical Decision Making (PT Evaluation Complexity): low complexity  Overall Complexity (PT Evaluation Complexity): low complexity     PT Charges       Row Name 07/11/24 1103             Time Calculation    Start Time 1030  -CD      PT Received On 07/11/24  -CD         Untimed Charges    PT Eval/Re-eval Minutes 40  -CD         Total Minutes    Untimed Charges Total Minutes 40  -CD       Total Minutes 40  -CD                User Key  (r) = Recorded By, (t) = Taken By, (c) = Cosigned By      Initials Name Provider Type    CD Magaly Ramires, PT Physical Therapist                  Therapy Charges for Today       Code Description Service Date Service Provider Modifiers Qty    59793837611  PT EVAL LOW COMPLEXITY 3 7/11/2024 Magaly Ramires, PT GP 1            PT G-Codes  Outcome Measure Options: AM-PAC 6 Clicks Daily Activity (OT), Modified Casimiro  AM-PAC 6 Clicks Score (PT): 24  AM-PAC 6 Clicks Score (OT): 24  Modified [place] Scale: 1 - No significant disability despite symptoms.  Able to carry out all usual duties and activities.    PT Discharge Summary  Anticipated Discharge Disposition (PT): home    Magaly Ramires, PT  7/11/2024

## 2024-07-11 NOTE — THERAPY EVALUATION
Acute Care - Speech Language Pathology Initial Evaluation  [facility]  Cognitive-Communication Evaluation       Patient Name: Isa Vogel  : 1965  MRN: [id number]  Today's Date: 2024               Admit Date: 7/10/2024     Visit Dx:    ICD-10-CM ICD-9-CM   1. Facial weakness  R29.810 781.94   2. Facial numbness  R20.0 782.0   3. Hyperglycemia  R73.9 790.29     Patient Active Problem List   Diagnosis    Diabetes mellitus    Type 2 diabetes mellitus without complication    Essential hypertension    Morbid obesity due to excess calories    Routine health maintenance    Mild intermittent asthma    Annual physical exam    Breast cancer screening    Postmenopausal    Mixed hyperlipidemia    Benign paroxysmal positional vertigo of left ear    Labyrinthitis of both ears    Vertigo    Lice infested hair    Generalized abdominal pain    Reactive depression    Dysfunction of left eustachian tube    Visual impairment    Kidney disease    Heart disease    GERD (gastroesophageal reflux disease)    Diverticulosis    COPD (chronic obstructive pulmonary disease)    Bell's palsy    Asthma    Acute midline low back pain without sciatica    Urine abnormality    Yeast vaginitis    Bronchitis    Fever    Bilateral otitis media with effusion    Diarrhea    Anxiety    Uncontrolled type 2 diabetes mellitus with diabetic neuropathy, without long-term current use of insulin    Diabetic peripheral neuropathy associated with type 2 diabetes mellitus    Transient ischemic attack (TIA)     Past Medical History:   Diagnosis Date    Allergic     Asthma     Mccarthy's palsy     COPD (chronic obstructive pulmonary disease)     Diabetes mellitus     Diverticulosis     Essential hypertension 8/3/2016    GERD (gastroesophageal reflux disease)     Heart disease     Kidney disease     Obesity     Psoriasis     Visual impairment      Past Surgical History:   Procedure Laterality Date    ADENOIDECTOMY      CARDIAC ABLATION      HYSTERECTOMY       OOPHORECTOMY      TONSILLECTOMY      TUBAL ABDOMINAL LIGATION         SLP Recommendation and Plan  SLP Diagnosis: functional speech/language skills, functional cognitive-linguistic skills (07/11/24 0930)              [facility] Criteria for Skilled Therapy Interventions Met: no problems identified which require skilled intervention (07/11/24 0930)  Anticipated Discharge Disposition (SLP): home (07/11/24 0930)        Therapy Frequency (SLP SLC): evaluation only (07/11/24 0930)                                Plan of Care Reviewed With: patient, daughter (07/11/24 1003)      SLP EVALUATION (Last 72 Hours)       SLP SLC Evaluation       Row Name 07/11/24 0930                   Communication Assessment/Intervention    Document Type evaluation  -DV        Subjective Information no complaints  -DV        Patient Observations alert;cooperative  -DV        Patient/Family/Caregiver Comments/Observations dtr present at end of eval  -DV        Patient Effort good  -DV        Symptoms Noted During/After Treatment none  -DV           General Information    Patient Profile Reviewed yes  -DV        Pertinent History Of Current Problem see initial eval  -DV        Precautions/Limitations, Vision WFL;for purposes of eval  -DV        Precautions/Limitations, Hearing WFL;for purposes of eval  -DV        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  -DV        Prior Level of Function-Communication WFL  -DV        Plans/Goals Discussed with patient;family;agreed upon  -DV        Barriers to Rehab none identified  -DV        Patient's Goals for Discharge return to all previous roles/activities  -DV        Family Goals for Discharge family did not state  -DV           Pain    Additional Documentation Pain Scale: Numbers Pre/Post-Treatment (Group)  -DV           Pain Scale: Numbers Pre/Post-Treatment    Pretreatment Pain Rating 2/10  -DV        Posttreatment Pain Rating 2/10  -DV        Pain Location - head  -DV            Comprehension Assessment/Intervention    Comprehension Assessment/Intervention Auditory Comprehension  -DV           Auditory Comprehension Assessment/Intervention    Auditory Comprehension (Communication) WFL  -DV        Answers Questions (Communication) yes/no;WFL  -DV        Able to Follow Commands (Communication) 3-step;WFL  -DV        Narrative Discourse conversational level;WFL  -DV           Expression Assessment/Intervention    Expression Assessment/Intervention verbal expression  -DV           Verbal Expression Assessment/Intervention    Verbal Expression WFL  -DV        Responsive Naming WFL  -DV        Spontaneous/Functional Words WFL  -DV        Sentence Formulation complex;WFL  -DV        Conversational Discourse/Fluency WFL  -DV           Oral Musculature and Cranial Nerve Assessment    Oral Motor General Assessment oral labial or buccal impairment  -DV        Oral Labial or Buccal Impairment, Detail, Cranial Nerve VII (Facial): left labial droop  -DV           Motor Speech Assessment/Intervention    Motor Speech Function WFL  -DV           Cursory Voice Assessment/Intervention    Quality and Resonance (Voice) WFL  -DV           Cognitive Assessment Intervention- SLP    Cognitive Function (Cognition) WFL  -DV        Orientation Status (Cognition) person;place;time;situation;WFL  -DV        Memory (Cognitive) short-term;WFL  -DV        Attention (Cognitive) WFL  -DV        Thought Organization (Cognitive) concrete divergent;WFL  -DV        Reasoning (Cognitive) deductive;mental flexibility;WFL  -DV        Problem Solving (Cognitive) temporal;WFL  -DV        Executive Function (Cognition) WFL  -DV        Pragmatics (Communication) WFL  -DV           SLP Evaluation Clinical Impressions    SLP Diagnosis functional speech/language skills;functional cognitive-linguistic skills  -DV        SLC Criteria for Skilled Therapy Interventions Met no problems identified which require skilled intervention  -DV         Functional Impact no impact on function  -DV           Recommendations    Therapy Frequency (SLP SLC) evaluation only  -DV        Anticipated Discharge Disposition (SLP) home  -DV                  User Key  (r) = Recorded By, (t) = Taken By, (c) = Cosigned By      Initials Name Effective Dates    Willow Anguiano MS CCC-SLP 06/16/21 -                        EDUCATION  The patient has been educated in the following areas:     Cognitive Impairment Communication Impairment.                        Time Calculation:      Time Calculation- SLP       Row Name 07/11/24 1007             Time Calculation- SLP    SLP Start Time 0930  -DV      SLP Received On 07/11/24  -DV         Untimed Charges    SLP Eval/Re-eval  ST Eval Speech and Production w/ Language - 93357  -DV      06042-XI Eval Speech and Production w/ Language Minutes 34  -DV         Total Minutes    Untimed Charges Total Minutes 34  -DV       Total Minutes 34  -DV                User Key  (r) = Recorded By, (t) = Taken By, (c) = Cosigned By      Initials Name Provider Type    Willow Anguiano MS CCC-SLP Speech and Language Pathologist                    Therapy Charges for Today       Code Description Service Date Service Provider Modifiers Qty    [id number] HC ST EVAL SPEECH AND PROD W LANG  2 7/11/2024 Willow Fitzgerald MS CCC-SLP GN 1                       Willow Fitzgerald MS CCC-GEOFF  7/11/2024

## 2024-07-11 NOTE — PLAN OF CARE
Problem: Adult Inpatient Plan of Care  Goal: Plan of Care Review  Outcome: Ongoing, Progressing  Flowsheets (Taken 7/11/2024 1003)  Plan of Care Reviewed With:   patient   daughter   Goal Outcome Evaluation:  Plan of Care Reviewed With: patient, daughter         SLP evaluation completed. Will sign-off. Please see note for further details and recommendations.]           Anticipated Discharge Disposition (SLP): home    SLP Diagnosis: functional speech/language skills, functional cognitive-linguistic skills (07/11/24 6237)

## 2024-07-11 NOTE — DISCHARGE SUMMARY
[facility] Medicine Services  DISCHARGE SUMMARY    Patient Name: Isa Vogel  : 1965  MRN: [id number]    Date of Admission: 7/10/2024  1:47 PM  Date of Discharge:  2024  Primary Care Physician: Anders Solis MD    Consults       Date and Time Order Name Status Description    7/10/2024  1:46 PM Inpatient Neurology Consult Stroke              Hospital Course     Presenting Problem:     Active Hospital Problems    Diagnosis  POA    Transient ischemic attack (TIA) [G45.9]  Unknown    Mixed hyperlipidemia [E78.2]  Yes    Diabetes mellitus [E11.9]  Yes    Essential hypertension [I10]  Yes      Resolved Hospital Problems   No resolved problems to display.          Hospital Course:  Isa Vogel is a 58 y.o. female Isa Vogel is a 58 y.o. female with with past medical history of essential hypertension, dyslipidemia, type 2 diabetes, obesity, A-fib status post ablation few years ago, currently not on anticoagulation, obstructive sleep apnea who presented to the hospital with left facial and left upper extremity numbness.  Symptoms started around 12:45 PM while she is at work with sudden onset of left facial numbness, quickly progressed to involve her left upper extremity.  She went to [facility] and was seen by neuro telemetry and was transferred to [facility] for further evaluation.  Upon arrival to the hospital, her symptoms much improved.  She still have some numbness on the left side of her face but much improved from when it first started.  Left upper extremity numbness almost resolved.  No associated weakness, dysarthria, lightheadedness, or headache   Reports h/o Bell's palsy about 10 years ago and this is similar but not as bad.  Also c/o toothache    Left Sided Bell's Palsy  --d/w Dr. Dent/stroke neuro recs recs valtrex x 7 days.  Hold on steroids d/t poorly controlled DM and patient instructed to call PCP if feels needs  --no acute on MRI  brain    Toothache  --augmentin in case of abscess. F/u with dentist    DM, poorly controlled  -- increase home lantus to 40 units qhs  -- continue other home meds.  Add farxiga  -- d/w patient importance of better control of her DM.  Refer to endocrine     HTN  HLD  Hx of Afib   -- BP ok holding meds.  Restart norvasc but continue to hold chlorthalidone for now at home  -- statin     CASSY   -- does not use cpap at home    Discharge Follow Up Recommendations for outpatient labs/diagnostics:   F/u with PCP in 1 week  F/u with endocrinology for uncontrolled DM  F/u Dentist    Day of Discharge     HPI:   Feels better.  LUE tingling but better.  Left face still numb and eye droopy.    States that much better today.  Feels ready to go home.     Review of Systems  Gen- No fevers, chills  CV- No chest pain, palpitations  Resp- No cough, dyspnea  GI- No N/V/D, abd pain      Vital Signs:   Temp:  [97.9 °F (36.6 °C)-98.2 °F (36.8 °C)] 98 °F (36.7 °C)  Heart Rate:  [55-71] 59  Resp:  [14-18] 16  BP: (111-142)/(62-88) 126/77      Physical Exam:  Constitutional: No acute distress, awake, alert  HENT: NCAT, mucous membranes moist  Respiratory: Clear to auscultation bilaterally, respiratory effort normal   Cardiovascular: RRR, no murmurs, rubs, or gallops  Gastrointestinal: Positive bowel sounds, soft, nontender, nondistended  Musculoskeletal: No bilateral ankle edema  Psychiatric: Appropriate affect, cooperative  Neurologic: Oriented x 3, moves all extremities, mild left facial droop speech clear  Skin: No rashes      Pertinent  and/or Most Recent Results     LAB RESULTS:      Lab 07/11/24  0430 07/10/24  1404   WBC 7.74 9.26   HEMOGLOBIN 14.4 14.5   HEMATOCRIT 42.6 42.3   PLATELETS 258 267   NEUTROS ABS 3.72 5.81   IMMATURE GRANS (ABS) 0.01 0.01   LYMPHS ABS 3.30* 2.71   MONOS ABS 0.50 0.56   EOS ABS 0.14 0.11   MCV 94.0 92.4   APTT  --  24.6         Lab 07/11/24  0430 07/10/24  1404   SODIUM 138 133*   POTASSIUM 4.3 4.2    CHLORIDE 102 98   CO2 28.0 23.2   ANION GAP 8.0 11.8   BUN 13 14   CREATININE 0.89 1.00   EGFR 75.7 65.8   GLUCOSE 289* 511*   CALCIUM 8.8 9.2   HEMOGLOBIN A1C 11.60*  --          Lab 07/11/24  0430 07/10/24  1404   TOTAL PROTEIN 5.8* 6.4   ALBUMIN 3.4* 3.8   GLOBULIN 2.4 2.6   ALT (SGPT) 29 35*  35*   AST (SGOT) 23 32  33*   BILIRUBIN 0.7 0.6   ALK PHOS 87 90         Lab 07/10/24  1404   PROBNP 551.0   HSTROP T 21*         Lab 07/11/24  0430   CHOLESTEROL 159   LDL CHOL 92   HDL CHOL 40   TRIGLYCERIDES 157*             Brief Urine Lab Results       None          Microbiology Results (last 10 days)       ** No results found for the last 240 hours. **            Adult Transthoracic Echo Complete W/ Cont if Necessary Per Protocol (With Agitated Saline)    Result Date: 7/11/2024    Left ventricular systolic function is normal. Left ventricular ejection fraction appears to be 56 - 60%.   Left ventricular wall thickness is consistent with mild concentric hypertrophy.   Saline test results are negative.     MRI Brain Without Contrast    Result Date: 7/10/2024  MRI BRAIN WO CONTRAST Date of Exam: 7/10/2024 4:30 PM EDT Indication: Stroke, follow up transient LFD and numbness.  Comparison: CT stroke study 7/10/2024 Technique:  Routine multiplanar/multisequence sequence images of the brain were obtained without contrast administration. Findings: There is no diffusion restriction to suggest acute infarct. There is no evidence of acute or chronic intracranial hemorrhage. No mass effect or midline shift. No abnormal extra-axial collections. The basal ganglia, brainstem and cerebellum appear within normal limits. Midline structures are intact. Mild punctate subcortical and periventricular T2/FLAIR white matter hyperintensities most likely represent the sequela of chronic microvascular ischemic disease. There is a small remote lacunar infarct of the right posterior basal ganglia. Calvarial and superficial soft tissue signal is  within normal limits. Orbits appear unremarkable. There is trace mucosal thickening of the bilateral ethmoid air cells. No sinus air-fluid levels. The mastoid air cells are clear.     Impression: No acute intracranial abnormality. Electronically Signed: Luz Zapata MD  7/10/2024 7:32 PM EDT  Workstation ID: [id number]    XR Chest 1 View    Result Date: 7/10/2024  XR CHEST 1 VW Date of Exam: 7/10/2024 2:43 PM EDT Indication: Acute Stroke Protocol (onset < 12 hrs) Comparison: None available. Findings: The heart is prominent in size. No pneumothorax or pleural effusion. No focal infiltrate.     Impression: Mild cardiomegaly but no acute cardiopulmonary process. Electronically Signed: Oriana Bustamante MD  7/10/2024 3:05 PM EDT  Workstation ID: [id number]    CT Angiogram Head w AI Analysis of LVO    Result Date: 7/10/2024  CT ANGIOGRAM NECK, CT ANGIOGRAM HEAD W AI ANALYSIS OF LVO Date of Exam: 7/10/2024 1:56 PM EDT Indication: Neuro Deficit, acute, Stroke suspected Neuro deficit, acute stroke suspected. Comparison: Noncontrast head CT from today Technique: CTA of the neck was performed before and after the uneventful intravenous administration of 85 mL Isovue-370. Reconstructed coronal and sagittal images were also obtained. In addition, a 3-D volume rendered image was created for interpretation. Automated exposure control and iterative reconstruction methods were used. FINDINGS: Vascular Findings: The right common carotid, internal carotid, middle cerebral, anterior cerebral, vertebral, and posterior cerebral arteries are patent without abrupt cut off or aneurysmal dilation. 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 The left common carotid, internal carotid, middle cerebral, anterior cerebral, vertebral, and posterior cerebral arteries are patent without abrupt cut off or aneurysmal dilation. Questionable focal moderate stenosis of an M2 branch of the left MCA (series 7, image  342). This appearance may be artifactual. Basilar artery appears patent and appears unremarkable. Non-vascular Findings: For description of nonvascular intracranial findings, please refer to the noncontrast head CT performed the same date. No acute abnormality is identified within the visualized soft tissue or bony structures of the neck. The visualized lung apices are clear.     1.No acute abnormality identified within the large arteries of the head or neck. 2.No significant stenosis of the bilateral internal carotid arteries. 3.Questionable focal moderate stenosis of an M2 branch of the left MCA (series 7, image 342). This appearance may be artifactual. Electronically Signed: Abdi Blanchard MD  7/10/2024 2:23 PM EDT  Workstation ID: [id number]    CT Angiogram Neck    Result Date: 7/10/2024  CT ANGIOGRAM NECK, CT ANGIOGRAM HEAD W AI ANALYSIS OF LVO Date of Exam: 7/10/2024 1:56 PM EDT Indication: Neuro Deficit, acute, Stroke suspected Neuro deficit, acute stroke suspected. Comparison: Noncontrast head CT from today Technique: CTA of the neck was performed before and after the uneventful intravenous administration of 85 mL Isovue-370. Reconstructed coronal and sagittal images were also obtained. In addition, a 3-D volume rendered image was created for interpretation. Automated exposure control and iterative reconstruction methods were used. FINDINGS: Vascular Findings: The right common carotid, internal carotid, middle cerebral, anterior cerebral, vertebral, and posterior cerebral arteries are patent without abrupt cut off or aneurysmal dilation. 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 The left common carotid, internal carotid, middle cerebral, anterior cerebral, vertebral, and posterior cerebral arteries are patent without abrupt cut off or aneurysmal dilation. Questionable focal moderate stenosis of an M2 branch of the left MCA (series 7, image 342). This appearance may  be artifactual. Basilar artery appears patent and appears unremarkable. Non-vascular Findings: For description of nonvascular intracranial findings, please refer to the noncontrast head CT performed the same date. No acute abnormality is identified within the visualized soft tissue or bony structures of the neck. The visualized lung apices are clear.     1.No acute abnormality identified within the large arteries of the head or neck. 2.No significant stenosis of the bilateral internal carotid arteries. 3.Questionable focal moderate stenosis of an M2 branch of the left MCA (series 7, image 342). This appearance may be artifactual. Electronically Signed: Abdi Blanchard MD  7/10/2024 2:23 PM EDT  Workstation ID: [id number]    CT Head Without Contrast Stroke Protocol    Result Date: 7/10/2024  CT HEAD WO CONTRAST STROKE PROTOCOL Date of Exam: 7/10/2024 1:50 PM EDT Indication: Neuro deficit, acute, stroke suspected Neuro Deficit, acute, Stroke suspected. Comparison: None available. Technique: Axial CT images were obtained of the head without contrast administration.  Reconstructed coronal images were also obtained. Automated exposure control and iterative construction methods were used. Scan Time: 1:52 p.m. Results discussed with ER personnel at 1:56 p.m.. Findings: Ventricles are normal in size and shape and are midline. There is a prominent perivascular space or old insult of the right temporal lobe. There is no acute mass effect or edema. There is no acute CVA or hemorrhage. Paranasal sinuses are clear. Mastoid air cells are well aerated.     Impression: No acute process. Electronically Signed: Sharon Jay MD  7/10/2024 1:59 PM EDT  Workstation ID: [id number]             Results for orders placed during the hospital encounter of 07/10/24    Adult Transthoracic Echo Complete W/ Cont if Necessary Per Protocol (With Agitated Saline)    Interpretation Summary    Left ventricular systolic function is normal. Left  ventricular ejection fraction appears to be 56 - 60%.    Left ventricular wall thickness is consistent with mild concentric hypertrophy.    Saline test results are negative.      Plan for Follow-up of Pending Labs/Results:     Discharge Details        Discharge Medications        New Medications        Instructions Start Date   amoxicillin-clavulanate 875-125 MG per tablet  Commonly known as: AUGMENTIN   1 tablet, Oral, 2 Times Daily      valACYclovir 1000 MG tablet  Commonly known as: VALTREX   1,000 mg, Oral, 3 times daily             Changes to Medications        Instructions Start Date   Insulin Glargine 100 UNIT/ML injection pen  Commonly known as: LANTUS SOLOSTAR  What changed: how much to take   40 Units, Subcutaneous, Nightly             Continue These Medications        Instructions Start Date   albuterol (2.5 MG/3ML) 0.083% nebulizer solution  Commonly known as: PROVENTIL   2.5 mg, Nebulization, Every 4 Hours PRN      amLODIPine 5 MG tablet  Commonly known as: NORVASC   5 mg, Oral, Daily      atorvastatin 40 MG tablet  Commonly known as: LIPITOR   40 mg, Oral, Daily      B-D UF III MINI PEN NEEDLES 31G X 5 MM misc  Generic drug: Insulin Pen Needle   USE AS DIRECTED TWICE DAILY      cetirizine 5 MG tablet  Commonly known as: zyrTEC   5 mg, Oral, Daily PRN      citalopram 40 MG tablet  Commonly known as: CeleXA   40 mg, Oral, Daily      gabapentin 100 MG capsule  Commonly known as: NEURONTIN   Take 3 capsules by mouth 2 (Two) Times a Day.      glucose blood test strip  Commonly known as: Accu-Chek Dulce Plus   Check BS TID      guanFACINE 1 MG tablet  Commonly known as: Tenex   1 mg, Oral, Daily PRN      Lancet Device misc   1 each, Does not apply, 2 Times Daily      ondansetron 4 MG tablet  Commonly known as: Zofran   4 mg, Oral, Every 8 Hours PRN      pantoprazole 40 MG EC tablet  Commonly known as: PROTONIX   TAKE 1 TABLET BY MOUTH DAILY      SITagliptin 100 MG tablet  Commonly known as: Januvia   100 mg,  Oral, Daily             Stop These Medications      chlorthalidone 25 MG tablet  Commonly known as: HYGROTON     DermOtic 0.01 % oil otic oil  Generic drug: fluocinolone acetonide     Unable to find              Allergies   Allergen Reactions    Lisinopril     Tramadol          Discharge Disposition:  Home or Self Care    Diet:  Hospital:  Diet Order   Procedures    Diet: Cardiac, Diabetic; Healthy Heart (2-3 Na+); Consistent Carbohydrate; Texture: Regular (IDDSI 7); Fluid Consistency: Thin (IDDSI 0)            Activity:      Restrictions or Other Recommendations:         CODE STATUS:    Code Status and Medical Interventions:   Ordered at: 07/10/24 1814     Medical Intervention Limits:    No intubation (DNI)     Level Of Support Discussed With:    Patient     Code Status (Patient has no pulse and is not breathing):    No CPR (Do Not Attempt to Resuscitate)     Medical Interventions (Patient has pulse or is breathing):    Limited Support       Future Appointments   Date Time Provider Department Center   8/2/2024 11:30 AM [facility]       Additional Instructions for the Follow-ups that You Need to Schedule       Discharge Follow-up with PCP   As directed       Currently Documented PCP:    Anders Solis MD    PCP Phone Number:    [phone]     Follow Up Details: with PCP in 1 week        Discharge Follow-up with Specified Provider: with your dentist   As directed      To: with your dentist   Follow Up Details: for tooth pain                      Castro Herring MD  07/11/24      Time Spent on Discharge:  I spent  37  minutes on this discharge activity which included: face-to-face encounter with the patient, reviewing the data in the system, coordination of the care with the nursing staff as well as consultants, documentation, and entering orders.

## 2024-07-11 NOTE — THERAPY DISCHARGE NOTE
Acute Care - Occupational Therapy Discharge  [facility]    Patient Name: Isa Vogel  : 1965    MRN: [id number]                              Today's Date: 2024       Admit Date: 7/10/2024    Visit Dx:     ICD-10-CM ICD-9-CM   1. Facial weakness  R29.810 781.94   2. Facial numbness  R20.0 782.0   3. Hyperglycemia  R73.9 790.29     Patient Active Problem List   Diagnosis    Diabetes mellitus    Type 2 diabetes mellitus without complication    Essential hypertension    Morbid obesity due to excess calories    Routine health maintenance    Mild intermittent asthma    Annual physical exam    Breast cancer screening    Postmenopausal    Mixed hyperlipidemia    Benign paroxysmal positional vertigo of left ear    Labyrinthitis of both ears    Vertigo    Lice infested hair    Generalized abdominal pain    Reactive depression    Dysfunction of left eustachian tube    Visual impairment    Kidney disease    Heart disease    GERD (gastroesophageal reflux disease)    Diverticulosis    COPD (chronic obstructive pulmonary disease)    Bell's palsy    Asthma    Acute midline low back pain without sciatica    Urine abnormality    Yeast vaginitis    Bronchitis    Fever    Bilateral otitis media with effusion    Diarrhea    Anxiety    Uncontrolled type 2 diabetes mellitus with diabetic neuropathy, without long-term current use of insulin    Diabetic peripheral neuropathy associated with type 2 diabetes mellitus    Transient ischemic attack (TIA)     Past Medical History:   Diagnosis Date    Allergic     Asthma     Mccarthy's palsy     COPD (chronic obstructive pulmonary disease)     Diabetes mellitus     Diverticulosis     Essential hypertension 8/3/2016    GERD (gastroesophageal reflux disease)     Heart disease     Kidney disease     Obesity     Psoriasis     Visual impairment      Past Surgical History:   Procedure Laterality Date    ADENOIDECTOMY      CARDIAC ABLATION      HYSTERECTOMY      OOPHORECTOMY       TONSILLECTOMY      TUBAL ABDOMINAL LIGATION        General Information       Row Name 07/11/24 1005          OT Time and Intention    Document Type discharge evaluation/summary  -CS     Mode of Treatment occupational therapy  -CS       Row Name 07/11/24 1005          General Information    Patient Profile Reviewed yes  -CS     Prior Level of Function independent:;all household mobility;ADL's;work;driving  works 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 [employer], no AD reported at baseline, Dtr owns [place]  -CS     Existing Precautions/Restrictions other (see comments)  intermittent LUE paresthesias  -CS     Barriers to Rehab medically complex  -CS       Row Name 07/11/24 1005          Occupational Profile    Reason for Services/Referral (Occupational Profile) stroke work up  -CS       Row Name 07/11/24 1005          Living Environment    People in Home spouse  -CS       Row Name 07/11/24 1005          Home Main Entrance    Number of Stairs, Main Entrance two  -CS       Row Name 07/11/24 1005          Stairs Within Home, Primary    Number of Stairs, Within Home, Primary none  -CS       Row Name 07/11/24 1005          Cognition    Orientation Status (Cognition) oriented x 4  -CS       Row Name 07/11/24 1005          Safety Issues, Functional Mobility    Impairments Affecting Function (Mobility) sensation/sensory awareness  -CS     Comment, Safety Issues/Impairments (Mobility) intermittent distal LUE paresthesia  -CS               User Key  (r) = Recorded By, (t) = Taken By, (c) = Cosigned By      Initials Name Provider Type    CS Alejandro Eli, OT Occupational Therapist                   Mobility/ADL's       Row Name 07/11/24 1011          Bed Mobility    Bed Mobility supine-sit;scooting/bridging  -CS     Scooting/Bridging [place] (Bed Mobility) independent  -CS     Supine-Sit [place] (Bed Mobility) modified independence  -CS     Assistive Device (Bed Mobility) head of bed elevated  -CS     Comment, (Bed Mobility) no dizziness reported in  sitting  -CS       [facility] Name 07/11/24 1011          Transfers    Transfers sit-stand transfer;bed-chair transfer  -CS     Comment, (Transfers) no dizziness reported in standing, no AD or LOB  -CS       [facility] Name 07/11/24 1011          Bed-Chair Transfer    Bed-Chair [place] (Transfers) independent  -[facility] Name 07/11/24 1011          Sit-Stand Transfer    Sit-Stand [place]ilo (Transfers) independent  -CS       Row Name 07/11/24 1011          Functional Mobility    Functional Mobility- Comment defer to PT for specifics, no LOB during in-room distance, no AD  -CS     Patient was able to Ambulate yes  -CS       [facility] Name 07/11/24 1011          Activities of Daily Living    BADL Assessment/Intervention lower body dressing;grooming;feeding  -CS       Row Name 07/11/24 1011          Lower Body Dressing Assessment/Training    [place] Level (Lower Body Dressing) don;shoes/slippers;independent  -CS     Position (Lower Body Dressing) edge of bed sitting  -[facility] Name 07/11/24 1011          Grooming Assessment/Training    [place] Level (Grooming) grooming skills;independent  -CS       Row Name 07/11/24 1011          Self-Feeding Assessment/Training    [place] Level (Feeding) liquids to mouth;independent  -CS     Position (Self-Feeding) supported sitting  -CS               User Key  (r) = Recorded By, (t) = Taken By, (c) = Cosigned By      Initials Name Provider Type    Alejandro Wnin OT Occupational Therapist                   Obj/Interventions       Row Name 07/11/24 1014          Sensory Assessment (Somatosensory)    Sensory Assessment (Somatosensory) bilateral UE  -CS     Bilateral UE Sensory Assessment general sensation;light touch awareness;light touch localization;intact  -CS       Row Name 07/11/24 1014          Vision Assessment/Intervention    Visual Impairment/Limitations WFL;corrective lenses full-time  -CS       Row Name 07/11/24 1014          Range of Motion Comprehensive     General Range of Motion no range of motion deficits identified  -CS     Comment, General Range of Motion history of frozen shoulder, no limitations this date  -       Row Name 07/11/24 1014          Strength Comprehensive (MMT)    General Manual Muscle Testing (MMT) Assessment no strength deficits identified  -CS     Comment, General Manual Muscle Testing (MMT) Assessment BUE grossly 5/5 and symmetrical  -CS       Row Name 07/11/24 1014          Motor Skills    Motor Skills coordination;functional endurance  -CS     Coordination bimanual skills;WFL  -CS     Functional Endurance O2 sats stable on RA  -CS       Row Name 07/11/24 1014          Balance    Balance Assessment sitting static balance;sitting dynamic balance;standing static balance;standing dynamic balance  -CS     Static Sitting Balance independent  -CS     Dynamic Sitting Balance independent  -CS     Position, Sitting Balance unsupported;sitting edge of bed  -CS     Static Standing Balance independent  -CS     Dynamic Standing Balance independent  -CS     Position/Device Used, Standing Balance unsupported  -CS     Balance Interventions standing;sitting;sit to stand;occupation based/functional task  -CS     Comment, Balance no LOB during ADLs or related mobility  -CS               User Key  (r) = Recorded By, (t) = Taken By, (c) = Cosigned By      Initials Name Provider Type    Alejandro Winn, OT Occupational Therapist                   Goals/Plan    No documentation.                  Clinical Impression       Row Name 07/11/24 1017          Pain Assessment    Additional Documentation Pain Scale: FACES Pre/Post-Treatment (Group)  -[facility] Name 07/11/24 1017          Pain Scale: FACES Pre/Post-Treatment    Pain: FACES Scale, Pretreatment 0-->no hurt  -CS     Posttreatment Pain Rating 0-->no hurt  -       Row Name 07/11/24 1017          Plan of Care Review    Plan of Care Reviewed With patient;daughter  -     Progress no change  -      Outcome Evaluation Pt presents at baseline for ADL completion with symmetrical BUE strength and coordination intact. Reports mild intermittent LUE paresthesia, WFL for safe ADL performance. OT signing off, please reconsult if needed. Rec d/c to home when medically appropriate.  -CS       Row Name 07/11/24 1017          Therapy Assessment/Plan (OT)    Patient/Family Therapy Goal Statement (OT) return home  -CS     Criteria for Skilled Therapeutic Interventions Met (OT) no;does not meet criteria for skilled intervention  -CS     Therapy Frequency (OT) evaluation only  -CS       Row Name 07/11/24 1017          Therapy Plan Review/Discharge Plan (OT)    Anticipated Discharge Disposition (OT) home  -CS       Row Name 07/11/24 1017          Vital Signs    Pre Systolic BP Rehab 126  RN cleared for eval  -CS     Pre Treatment Diastolic BP 77  -CS     Post Systolic BP Rehab 130  -CS     Post Treatment Diastolic BP 90  -CS     O2 Delivery Pre Treatment room air  -CS     O2 Delivery Intra Treatment room air  -CS     O2 Delivery Post Treatment room air  -CS     Pre Patient Position Supine  -CS     Post Patient Position Sitting  -CS       Row Name 07/11/24 1017          Positioning and Restraints    Pre-Treatment Position in bed  -CS     Post Treatment Position chair  -CS     In Chair notified nsg;reclined;sitting;call light within reach;encouraged to call for assist;legs elevated;with family/caregiver  no alarm per RN, up ad mack  -CS               User Key  (r) = Recorded By, (t) = Taken By, (c) = Cosigned By      Initials Name Provider Type    CS Alejandro Eli, OT Occupational Therapist                   Outcome Measures       Row Name 07/11/24 1019          How much help from another is currently needed...    Putting on and taking off regular lower body clothing? 4  -CS     Bathing (including washing, rinsing, and drying) 4  -CS     Toileting (which includes using toilet bed pan or urinal) 4  -CS     Putting on and taking  off regular upper body clothing 4  -CS     Taking care of personal grooming (such as brushing teeth) 4  -CS     Eating meals 4  -CS     AM-PAC 6 Clicks Score (OT) 24  -CS       Row Name 07/11/24 1019          Modified Pettis Scale    Modified Casimiro Scale 1 - No significant disability despite symptoms.  Able to carry out all usual duties and activities.  -       Row Name 07/11/24 1019          Functional Assessment    Outcome Measure Options AM-PAC 6 Clicks Daily Activity (OT);Modified Pettis  -CS               User Key  (r) = Recorded By, (t) = Taken By, (c) = Cosigned By      Initials Name Provider Type    CS Alejandro Eli OT Occupational Therapist                  Occupational Therapy Education       Title: PT OT SLP Therapies (Done)       Topic: Occupational Therapy (Done)       Point: Precautions (Done)       Description:   Instruct learner(s) on prescribed precautions during self-care and functional transfers.                  Learning Progress Summary             Patient Acceptance, E,D, VU,DU by  at 7/11/2024 1020   Father Acceptance, E,D, VU,DU by  at 7/11/2024 1020                                         User Key       Initials Effective Dates Name Provider Type Discipline     06/16/21 -  Alejandro Eli OT Occupational Therapist OT                  OT Recommendation and Plan  Therapy Frequency (OT): evaluation only  Plan of Care Review  Plan of Care Reviewed With: patient, daughter  Progress: no change  Outcome Evaluation: Pt presents at baseline for ADL completion with symmetrical BUE strength and coordination intact. Reports mild intermittent LUE paresthesia, WFL for safe ADL performance. OT signing off, please reconsult if needed. Rec d/c to home when medically appropriate.  Plan of Care Reviewed With: patient, daughter  Outcome Evaluation: Pt presents at baseline for ADL completion with symmetrical BUE strength and coordination intact. Reports mild intermittent LUE paresthesia, WFL for  safe ADL performance. OT signing off, please reconsult if needed. Rec d/c to home when medically appropriate.     Time Calculation:   Evaluation Complexity (OT)  Review Occupational Profile/Medical/Therapy History Complexity: brief/low complexity  Assessment, Occupational Performance/Identification of Deficit Complexity: 1-3 performance deficits  Clinical Decision Making Complexity (OT): problem focused assessment/low complexity  Overall Complexity of Evaluation (OT): low complexity     Time Calculation- OT       Row Name 07/11/24 1027             Time Calculation- OT    OT Start Time 0950  -CS      OT Received On 07/11/24  -CS      OT Goal Re-Cert Due Date 07/21/24  -CS         Untimed Charges    OT Eval/Re-eval Minutes 38  -CS         Total Minutes    Untimed Charges Total Minutes 38  -CS       Total Minutes 38  -CS                User Key  (r) = Recorded By, (t) = Taken By, (c) = Cosigned By      Initials Name Provider Type    CS Alejandro Eli, OT Occupational Therapist                  Therapy Charges for Today       Code Description Service Date Service Provider Modifiers Qty    [id number]  OT EVAL LOW COMPLEXITY 3 7/11/2024 Alejandro Eli OT GO 1               OT Discharge Summary  Anticipated Discharge Disposition (OT): home  Reason for Discharge: At baseline function, Independent  Discharge Destination: Home    Alejandro Eli OT  7/11/2024

## 2024-07-11 NOTE — PROGRESS NOTES
Stroke Neurology Progress Note     Subjective     This patient was seen in follow-up for: left complete facial droop   Present for the encounter were: self, patient, patient's daughter    Subjective:  My first encounter with the patient.  No acute events overnight.  Patient tells me she had a history of left Bell's palsy approximately 10 years ago for which she was treated with steroids and antivirals.  Discussed MRI negative.  Physical exam notable for complete left facial droop consistent with Bell's palsy.  We discussed repeating course of antivirals.  We discussed repeating course of steroids, but shared decision making to hold on this for now given uncontrolled diabetes A1c 11.6%.  The patient has a primary care physician with whom she has close contact and she agrees to call for a repeat course of steroids should her symptoms worsen.  She reports concerns of dental infection for which she follows with a dentist and is undergoing workup for dentures.  Patient denies questions or concerns.     Objective      Temp:  [97.9 °F (36.6 °C)-98.2 °F (36.8 °C)] 98 °F (36.7 °C)  Heart Rate:  [55-71] 59  Resp:  [14-18] 16  BP: (111-142)/(62-88) 126/77            Objective    Physical Exam:  General Appearance: Alert  HEENT: anicteric sclera, no scleral injection  Lungs: respirations appear comfortable, no obvious increased work of breathing  Extremities: No cyanosis or fingernail clubbing   Skin: No rashes in exposed skin areas     Neurological Examination:   Mental status: Alert and oriented. No dysarthria. Able to name and repeat.  Cranial Nerves: Visual fields intact. Extraocular movements intact with no nystagmus. Midline gaze.  Mild complete left facial droop.  Left facial numbness.  Able to close left eye.  Sensory: Normal sensory exam to light touch.  Motor: Normal tone. Absent pronator drift.  Strength:  LUE: 5/5 biceps, triceps,   LLE: 5/5 hip flexion/extension  RUE: 5/5 biceps, triceps,   RLE:  5/5 hip  flexion/extension  Cerebellar: Finger-to-nose intact. Heel-to-shin intact. Rapid alternating movements are intact.   Gait: Not assessed.     Labs:    Lab Results   Component Value Date    HGBA1C 11.60 (H) 07/11/2024      Lab Results   Component Value Date    CHOL 159 07/11/2024    TRIG 157 (H) 07/11/2024    HDL 40 07/11/2024    LDL 92 07/11/2024       Lab Results   Component Value Date    WBC 7.74 07/11/2024    HGB 14.4 07/11/2024    HCT 42.6 07/11/2024    MCV 94.0 07/11/2024     07/11/2024     Lab Results   Component Value Date    GLUCOSE 289 (H) 07/11/2024    BUN 13 07/11/2024    CREATININE 0.89 07/11/2024    EGFRIFNONA 56 (L) 01/06/2020    BCR 14.6 07/11/2024    CO2 28.0 07/11/2024    CALCIUM 8.8 07/11/2024    ALBUMIN 3.4 (L) 07/11/2024    AST 23 07/11/2024    ALT 29 07/11/2024       Results from last 7 days   Lab Units 07/11/24  0430 07/10/24  1404   SODIUM mmol/L 138 133*   POTASSIUM mmol/L 4.3 4.2   CHLORIDE mmol/L 102 98   CO2 mmol/L 28.0 23.2   BUN mg/dL 13 14   CREATININE mg/dL 0.89 1.00   CALCIUM mg/dL 8.8 9.2   BILIRUBIN mg/dL 0.7 0.6   ALK PHOS U/L 87 90   ALT (SGPT) U/L 29 35*  35*   AST (SGOT) U/L 23 32  33*   GLUCOSE mg/dL 289* 511*       Lab Results   Component Value Date    URINECX >100,000 CFU/mL Normal Urogenital Twila 12/11/2018       Results Review:      All brain images and reports were personally reviewed and I agree with the interpretations except as noted below.    MRI Brain Without Contrast 7/10/2024  Impression: No acute intracranial abnormality.     CT Angiogram Head and Neck 7/10/2024  Impression: 1. No acute abnormality identified within the large arteries of the head or neck. 2. No significant stenosis of the bilateral internal carotid arteries. 3.Questionable focal moderate stenosis of an M2 branch of the left MCA (series 7, image 342). This appearance may be artifactual.   I personally reviewed the images and I favor this to be artifact.    CT Head 7/10/2024  Impression: No  acute process.           Assessment/Plan     Assessment:    # Left [place] Palsy     -Patient reports left ear pain, will treat with valacyclovir 1000 mg 3 times daily x 1 week  -Discussed initiation of steroids.  Patient previously completed a course of steroids approximately 10 years ago for left-sided Bell's palsy.  Shared decision making to hold off on steroids for now given uncontrolled diabetes.  Patient agrees to call her primary care physician should her symptoms worsen for short course of steroids.  -TTE pending, I will call the patient with the results  -PT OT recommend home  -Follow-up in general neurology clinic as needed  -Normotensive blood pressure goals  -Continue home atorvastatin  -Patient does not need aspirin from stroke neurology perspective        Patient education: call 911 or present to emergency department with any stroke symptom, including unilateral face, arm, or leg weakness, numbness, or paresthesias, unilateral facial droop, speech deficits, dizziness with nausea, vomiting, nystagmus, and incoordination, visual deficits, or severe onset headache.    No further stroke neurology recommendations.  Please call with questions.     Debra Dent MD  [facility] STROKE NEURO  07/11/24  10:41 EDT

## 2024-07-11 NOTE — CONSULTS
"Diabetes Education  Assessment/Teaching    Patient Name:  Isa Vogel  YOB: 1965  MRN: [id number]  Admit Date:  7/10/2024      Assessment Date:  7/11/2024  Flowsheet Row Most Recent Value   General Information     Referral From: Other (comment)  [stroke protocol]   Height 162.6 cm (64\")   Height Method Estimated   Weight 124 kg (272 lb 7.8 oz)   Weight Method Bed scale   Patient expressed need none   Is patient pregnant? no   Pregnancy Assessment    Diabetes History    What type of diabetes do you have? Type 2   Length of Diabetes Diagnosis --  [several years]   Current DM knowledge fair   Have you had diabetes education/teaching in the past? no   When and where was your diabetes education? no formal ed, but has seen endocrinology in the past per pt report   Do you test your blood sugar at home? yes   Frequency of checks using [employer] CGMS   Who performs the test? self   Typical readings \"over 200 recently\"   Have you had low blood sugar? (<70mg/dl) yes   How often do you have low blood sugar? rare   Have you had high blood sugar? (>140mg/dl) yes   How often do you have high blood sugar? frequently   Education Preferences    What areas of diabetes would you like to learn about? avoiding high blood sugar   Nutrition Information    What type of support do you currently use to help you with your health issues? daughter   Assessment Topics    Healthy Eating - Assessment Needs education   Being Active - Assessment Competent   Taking Medication - Assessment Needs education   Problem Solving - Assessment Needs education   Reducing Risk - Assessment Needs education   Healthy Coping - Assessment Competent   Monitoring - Assessment Competent   DM Goals    Contact Plan Follow-up medical care            Flowsheet Row Most Recent Value   DM Education Needs    Meter Has own   Blood Glucose Target 150   Blood Glucose Target Range    Medication Oral, Insulin   Reducing Risks A1C testing, Lipids, Blood " "pressure   Healthy Eating Basic meal plan provided   Healthy Coping Appropriate   Discharge Plan Home   Motivation Engaged, Moderate   Teaching Method Explanation, Discussion, Handouts   Patient Response Verbalized understanding              Other Comments:  Total time spent reviewing chart, preparing education/materials, providing education at bedside, and coordinating care approx 25 minutes.    Consult for diabetes education received per stroke protocol. Chart reviewed. Pt was seen at bedside today. Permission given for visit. Also present was her daughter, who participated in some discussion today as well.     Discussed and taught Ms. Vogel about type 2 diabetes self-management, risk factors, and importance of blood glucose control to reduce complications. Target blood glucose readings and A1c goals per ADA were reviewed. Reviewed with patient current A1c 11.6 and discussed its significance. Signs, symptoms, and treatment of hyperglycemia and hypoglycemia were discussed.     Previous A1c was reportedly 9. Pt states her blood sugar has been running \"over 200 for a while\". Confirms current regimen is lantus 30 u daily (dose has not been adjusted for approx 6 months per pt report), januvia. Pt states she was taken off metformin a couple of months ago after a heart cath and was not advised to restart it. Also has humulin R at home and a sliding scale to use if needed. States she has not been using this much recently.  Pt and her daughter attribute current high glucoses in part to possible dental infection.     Time given for questions, pt denies any at this time. She has a Angélica 2 but does not have a blood glucose meter at home. We discussed how to obtain one and why it is important to  have as backup in the event of sensor failure or if symptoms do not match CGM reading.     Lifestyle changes such as physical activity with MD approval and healthy eating were encouraged. Encouraged pt to monitor blood sugar at home " "using CGM and to call PCP if blood sugar is trending high.  Encouraged to keep record of blood glucose readings to take to follow up appointment with PCP. Provided patient with copy of [employer]'s \"What is Diabetes\" handout, \"Blood Glucose Goals\" handout, and \"What is A1c\" handout as well as Nutrition Basics booklet.     Thank you for this consult.     Patient does not qualify for the follow up stroke class based on the exclusion criteria of  stroke work-up negative, pt dx with [place] Palsy.     Electronically signed by:  Ana M Winters RN  07/11/24 12:09 EDT  "

## 2024-07-11 NOTE — OUTREACH NOTE
Prep Survey      Flowsheet Row Responses   [facility] facility patient discharged [facility]? [facility]   Is LACE score < 7 ? No   Eligibility Readm Mgmt   Discharge diagnosis Transient ischemic attack   Does the patient have one of the following disease processes/diagnoses(primary or secondary)? Stroke   Does the patient have Home health ordered? No   Is there a DME ordered? No   Prep survey completed? Yes            ASHKAN HAYNES - Registered Nurse

## 2024-07-11 NOTE — PLAN OF CARE
Goal Outcome Evaluation:  Plan of Care Reviewed With: patient           Outcome Evaluation: GOALS NOT ESTABLISHED AS PT IS AT BASELINE WITH FUNCTIONAL MOBILITY. NO MOTOR, BALANCE, COORDINATION OR SENSATION DEFICITS EXCEPT MILD INTERMITTENT L UE PARESTHESIA. PT AMBULATED AND COMPLETED DYNAMIC BALANCE ACTIVITY WITHOUT LOB OR DIFFICULTY. DENIES PAIN OR DIZZINESS. PT IS A&O AND FOLLOWS ALL COMMANDS. D/C P.T. RECOMMEND HOME AT D/C.      Anticipated Discharge Disposition (PT): home

## 2024-07-11 NOTE — CASE MANAGEMENT/SOCIAL WORK
Discharge Planning Assessment  [facility]     Patient Name: Isa Vogel  MRN: [id number]  Today's Date: 7/11/2024    Admit Date: 7/10/2024    Plan: IDP   Discharge Needs Assessment    No documentation.                  Discharge Plan       Row Name 07/11/24 1059       Plan    Plan IDP    Patient/Family in Agreement with Plan yes    Plan Comments I spoke with the patient at the bedside. She lives in [place] in a trailer with her son. She is independent, works full time, drives, does not use medical equipment at home, and is not current with home or outpatient services. She confirmed her insurance is [employer] and her PCP is Anders Solis. She plans to return home at discharge. Daughter at bedside agreeable to transport home. Patient denied any needs from CM today. CM following.    Final Discharge Disposition Code 01 - home or self-care                  Continued Care and Services - Admitted Since 7/10/2024    No active coordination exists for this encounter.       Expected Discharge Date and Time       Expected Discharge Date Expected Discharge Time    Jul 11, 2024            Demographic Summary    No documentation.                  Functional Status    No documentation.                  Psychosocial    No documentation.                  Abuse/Neglect    No documentation.                  Legal    No documentation.                  Substance Abuse    No documentation.                  Patient Forms    No documentation.                     Zenaida Linda RN

## 2024-07-11 NOTE — PLAN OF CARE
Goal Outcome Evaluation:  Plan of Care Reviewed With: patient, daughter        Progress: no change  Outcome Evaluation: Pt presents at baseline for ADL completion with symmetrical BUE strength and coordination intact. Reports mild intermittent LUE paresthesia, WFL for safe ADL performance. OT signing off, please reconsult if needed. Rec d/c to home when medically appropriate.      Anticipated Discharge Disposition (OT): home

## 2024-07-12 ENCOUNTER — READMISSION MANAGEMENT (OUTPATIENT)
Dept: CALL CENTER | Facility: HOSPITAL | Age: 59
End: 2024-07-12
Payer: COMMERCIAL

## 2024-07-12 LAB
QT INTERVAL: 420 MS
QTC INTERVAL: 440 MS

## 2024-07-12 NOTE — OUTREACH NOTE
"Stroke Week 1 Survey      Flowsheet Row Responses   [facility] patient discharged from? [facility]   Does the patient have one of the following disease processes/diagnoses(primary or secondary)? Stroke   Week 1 attempt successful? Yes   Call start time 1200   Call end time 1212   Discharge diagnosis Transient ischemic attack   Meds reviewed with patient/caregiver? Yes   Is the patient having any side effects they believe may be caused by any medication additions or changes? No   Does the patient have all medications ordered at discharge? No   What is keeping the patient from filling the prescriptions? Financial, Prior authorization Issues  [insurance did not cover Farxiga, plans to discuss with PCP]   Nursing Interventions Nurse provided patient education   Is the patient taking all medications as directed (includes completed medication regime)? Yes   Does the patient have a primary care provider?  Yes   Does the patient have an appointment with their PCP within 7 days of discharge? Greater than 7 days   What is preventing the patient from scheduling follow up appointments within 7 days of discharge? Earlier appointment not available   Nursing Interventions Verified appointment date/time/provider   Has the patient kept scheduled appointments due by today? N/A   The Stroke [facility] at [facility] requests you follow up with them within 30 days for important follow up care. Please call [phone] to schedule this appointment. Thank you. Yes   Comments pt nurys on making appt with [facility] stroke clinic   Has home health visited the patient within 72 hours of discharge? N/A   Psychosocial issues? No   Does the patient require any assistance with activities of daily living such as eating, bathing, dressing, walking, etc.? No   Does the patient have any residual symptoms from stroke/TIA? Yes   Residual symptoms comments states mouth somewhat \"drawn\" today, H/A   Does the patient understand the diet " ordered at discharge? 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 Yes   Nursing interventions Reviewed instructions with patient   What is the patient's perception of their health status since discharge? Improving   Nursing interventions Nurse provided patient education   Is the patient/caregiver able to teach back the risk factors for a stroke? High blood pressure-goal below 120/80, Diabetes, High Cholesterol, Physical inactivity and obesity, History of TIAs   Is the patient/caregiver able to teach back signs and symptoms related to disease process for when to call PCP? Yes   Is the patient/caregiver able to teach back signs and symptoms related to disease process for when to call 911? Yes   If the patient is a current smoker, are they able to teach back resources for cessation? Not a smoker   Is the patient/caregiver able to teach back the hierarchy of who to call/visit for symptoms/problems? PCP, Specialist, Home health nurse, Urgent Care, ED, 911 Yes   Is the patient able to teach back FAST for Stroke? B alance: Watch for sudden loss of balance, E yes: Check for vision loss, F ace: Look for an uneven smile, A rm: Check if one arm is weak, S peech: Listen for slurred speech, T jacques: Call 9-1-1 right away   Week 1 call completed? Yes   Call end time 1212            [facility] BL[facility] - Registered Nurse

## 2024-09-18 ENCOUNTER — APPOINTMENT (OUTPATIENT)
Facility: HOSPITAL | Age: 59
End: 2024-09-18
Payer: COMMERCIAL

## 2024-09-18 ENCOUNTER — HOSPITAL ENCOUNTER (EMERGENCY)
Facility: HOSPITAL | Age: 59
Discharge: HOME | End: 2024-09-18
Attending: EMERGENCY MEDICINE
Payer: COMMERCIAL

## 2024-09-18 VITALS
TEMPERATURE: 98.6 F | WEIGHT: 258 LBS | SYSTOLIC BLOOD PRESSURE: 124 MMHG | DIASTOLIC BLOOD PRESSURE: 85 MMHG | BODY MASS INDEX: 39.1 KG/M2 | HEART RATE: 52 BPM | RESPIRATION RATE: 16 BRPM | HEIGHT: 68 IN | OXYGEN SATURATION: 98 %

## 2024-09-18 DIAGNOSIS — R07.9 CHEST PAIN, UNSPECIFIED TYPE: Primary | ICD-10-CM

## 2024-09-18 LAB
ALBUMIN SERPL-MCNC: 3.9 G/DL (ref 3.5–5.2)
ALBUMIN/GLOB SERPL: 1.4 G/DL
ALP SERPL-CCNC: 112 U/L (ref 39–117)
ALT SERPL W P-5'-P-CCNC: 32 U/L (ref 1–33)
ANION GAP SERPL CALCULATED.3IONS-SCNC: 9.3 MMOL/L (ref 5–15)
AST SERPL-CCNC: 36 U/L (ref 1–32)
BASOPHILS # BLD AUTO: 0.06 10*3/MM3 (ref 0–0.2)
BASOPHILS NFR BLD AUTO: 0.8 % (ref 0–1.5)
BILIRUB SERPL-MCNC: 0.6 MG/DL (ref 0–1.2)
BUN SERPL-MCNC: 12 MG/DL (ref 6–20)
BUN/CREAT SERPL: 14.1 (ref 7–25)
CALCIUM SPEC-SCNC: 9.6 MG/DL (ref 8.6–10.5)
CHLORIDE SERPL-SCNC: 97 MMOL/L (ref 98–107)
CO2 SERPL-SCNC: 25.7 MMOL/L (ref 22–29)
CREAT SERPL-MCNC: 0.85 MG/DL (ref 0.57–1)
DEPRECATED RDW RBC AUTO: 40.1 FL (ref 37–54)
EGFRCR SERPLBLD CKD-EPI 2021: 79.5 ML/MIN/1.73
EOSINOPHIL # BLD AUTO: 0.13 10*3/MM3 (ref 0–0.4)
EOSINOPHIL NFR BLD AUTO: 1.7 % (ref 0.3–6.2)
ERYTHROCYTE [DISTWIDTH] IN BLOOD BY AUTOMATED COUNT: 11.8 % (ref 12.3–15.4)
GEN 5 2HR TROPONIN T REFLEX: 22 NG/L
GLOBULIN UR ELPH-MCNC: 2.8 GM/DL
GLUCOSE SERPL-MCNC: 340 MG/DL (ref 65–99)
HCT VFR BLD AUTO: 44.5 % (ref 34–46.6)
HGB BLD-MCNC: 15.3 G/DL (ref 12–15.9)
HOLD SPECIMEN: NORMAL
IMM GRANULOCYTES # BLD AUTO: 0.02 10*3/MM3 (ref 0–0.05)
IMM GRANULOCYTES NFR BLD AUTO: 0.3 % (ref 0–0.5)
LIPASE SERPL-CCNC: 41 U/L (ref 13–60)
LYMPHOCYTES # BLD AUTO: 2.73 10*3/MM3 (ref 0.7–3.1)
LYMPHOCYTES NFR BLD AUTO: 35.5 % (ref 19.6–45.3)
MCH RBC QN AUTO: 31.6 PG (ref 26.6–33)
MCHC RBC AUTO-ENTMCNC: 34.4 G/DL (ref 31.5–35.7)
MCV RBC AUTO: 91.9 FL (ref 79–97)
MONOCYTES # BLD AUTO: 0.51 10*3/MM3 (ref 0.1–0.9)
MONOCYTES NFR BLD AUTO: 6.6 % (ref 5–12)
NEUTROPHILS NFR BLD AUTO: 4.25 10*3/MM3 (ref 1.7–7)
NEUTROPHILS NFR BLD AUTO: 55.1 % (ref 42.7–76)
NT-PROBNP SERPL-MCNC: 1043 PG/ML (ref 0–900)
PLATELET # BLD AUTO: 269 10*3/MM3 (ref 140–450)
PMV BLD AUTO: 10.2 FL (ref 6–12)
POTASSIUM SERPL-SCNC: 4.1 MMOL/L (ref 3.5–5.2)
PROT SERPL-MCNC: 6.7 G/DL (ref 6–8.5)
RBC # BLD AUTO: 4.84 10*6/MM3 (ref 3.77–5.28)
SODIUM SERPL-SCNC: 132 MMOL/L (ref 136–145)
TROPONIN T DELTA: 2 NG/L
TROPONIN T SERPL HS-MCNC: 20 NG/L
WBC NRBC COR # BLD AUTO: 7.7 10*3/MM3 (ref 3.4–10.8)
WHOLE BLOOD HOLD COAG: NORMAL
WHOLE BLOOD HOLD SPECIMEN: NORMAL

## 2024-09-18 PROCEDURE — 80053 COMPREHEN METABOLIC PANEL: CPT | Performed by: EMERGENCY MEDICINE

## 2024-09-18 PROCEDURE — 83690 ASSAY OF LIPASE: CPT | Performed by: EMERGENCY MEDICINE

## 2024-09-18 PROCEDURE — 93005 ELECTROCARDIOGRAM TRACING: CPT | Performed by: EMERGENCY MEDICINE

## 2024-09-18 PROCEDURE — 84484 ASSAY OF TROPONIN QUANT: CPT | Performed by: EMERGENCY MEDICINE

## 2024-09-18 PROCEDURE — 71045 X-RAY EXAM CHEST 1 VIEW: CPT

## 2024-09-18 PROCEDURE — 99284 EMERGENCY DEPT VISIT MOD MDM: CPT

## 2024-09-18 PROCEDURE — 36415 COLL VENOUS BLD VENIPUNCTURE: CPT

## 2024-09-18 PROCEDURE — 85025 COMPLETE CBC W/AUTO DIFF WBC: CPT | Performed by: EMERGENCY MEDICINE

## 2024-09-18 PROCEDURE — 83880 ASSAY OF NATRIURETIC PEPTIDE: CPT | Performed by: EMERGENCY MEDICINE

## 2024-09-18 RX ORDER — SODIUM CHLORIDE 0.9 % (FLUSH) 0.9 %
10 SYRINGE (ML) INJECTION AS NEEDED
Status: DISCONTINUED | OUTPATIENT
Start: 2024-09-18 | End: 2024-09-18 | Stop reason: HOSPADM

## 2024-09-18 RX ORDER — ASPIRIN 81 MG/1
324 TABLET, CHEWABLE ORAL ONCE
Status: COMPLETED | OUTPATIENT
Start: 2024-09-18 | End: 2024-09-18

## 2024-09-18 RX ORDER — MONTELUKAST SODIUM 10 MG/1
10 TABLET ORAL NIGHTLY
COMMUNITY

## 2024-09-18 RX ORDER — FLUTICASONE PROPIONATE AND SALMETEROL 100; 50 UG/1; UG/1
POWDER RESPIRATORY (INHALATION)
COMMUNITY

## 2024-09-18 RX ADMIN — ASPIRIN 81 MG 324 MG: 81 TABLET ORAL at 07:52

## 2024-09-22 LAB
QT INTERVAL: 456 MS
QT INTERVAL: 482 MS
QTC INTERVAL: 459 MS
QTC INTERVAL: 485 MS